# Patient Record
Sex: FEMALE | Race: WHITE | NOT HISPANIC OR LATINO | Employment: FULL TIME | URBAN - METROPOLITAN AREA
[De-identification: names, ages, dates, MRNs, and addresses within clinical notes are randomized per-mention and may not be internally consistent; named-entity substitution may affect disease eponyms.]

---

## 2017-01-03 LAB
ALBUMIN SERPL BCP-MCNC: 4.8 G/DL (ref 3.2–4.9)
ALBUMIN/GLOB SERPL: 1.8 G/DL
ALP SERPL-CCNC: 51 U/L (ref 30–99)
ALT SERPL-CCNC: 15 U/L (ref 2–50)
ANION GAP SERPL CALC-SCNC: 10 MMOL/L (ref 0–11.9)
AST SERPL-CCNC: 14 U/L (ref 12–45)
BASOPHILS # BLD AUTO: 0.4 % (ref 0–1.8)
BASOPHILS # BLD: 0.04 K/UL (ref 0–0.12)
BILIRUB SERPL-MCNC: 0.5 MG/DL (ref 0.1–1.5)
BUN SERPL-MCNC: 19 MG/DL (ref 8–22)
CALCIUM SERPL-MCNC: 9.9 MG/DL (ref 8.5–10.5)
CHLORIDE SERPL-SCNC: 106 MMOL/L (ref 96–112)
CO2 SERPL-SCNC: 20 MMOL/L (ref 20–33)
CREAT SERPL-MCNC: 0.78 MG/DL (ref 0.5–1.4)
EOSINOPHIL # BLD AUTO: 0.14 K/UL (ref 0–0.51)
EOSINOPHIL NFR BLD: 1.3 % (ref 0–6.9)
ERYTHROCYTE [DISTWIDTH] IN BLOOD BY AUTOMATED COUNT: 41.2 FL (ref 35.9–50)
GFR SERPL CREATININE-BSD FRML MDRD: >60 ML/MIN/1.73 M 2
GLOBULIN SER CALC-MCNC: 2.7 G/DL (ref 1.9–3.5)
GLUCOSE SERPL-MCNC: 98 MG/DL (ref 65–99)
HCT VFR BLD AUTO: 41.5 % (ref 37–47)
HGB BLD-MCNC: 13.8 G/DL (ref 12–16)
IMM GRANULOCYTES # BLD AUTO: 0.05 K/UL (ref 0–0.11)
IMM GRANULOCYTES NFR BLD AUTO: 0.5 % (ref 0–0.9)
LIPASE SERPL-CCNC: 61 U/L (ref 11–82)
LYMPHOCYTES # BLD AUTO: 3.89 K/UL (ref 1–4.8)
LYMPHOCYTES NFR BLD: 36.4 % (ref 22–41)
MCH RBC QN AUTO: 31.6 PG (ref 27–33)
MCHC RBC AUTO-ENTMCNC: 33.3 G/DL (ref 33.6–35)
MCV RBC AUTO: 95 FL (ref 81.4–97.8)
MONOCYTES # BLD AUTO: 0.78 K/UL (ref 0–0.85)
MONOCYTES NFR BLD AUTO: 7.3 % (ref 0–13.4)
NEUTROPHILS # BLD AUTO: 5.79 K/UL (ref 2–7.15)
NEUTROPHILS NFR BLD: 54.1 % (ref 44–72)
NRBC # BLD AUTO: 0 K/UL
NRBC BLD AUTO-RTO: 0 /100 WBC
PLATELET # BLD AUTO: 402 K/UL (ref 164–446)
PMV BLD AUTO: 9 FL (ref 9–12.9)
POTASSIUM SERPL-SCNC: 3.8 MMOL/L (ref 3.6–5.5)
PROT SERPL-MCNC: 7.5 G/DL (ref 6–8.2)
RBC # BLD AUTO: 4.37 M/UL (ref 4.2–5.4)
SODIUM SERPL-SCNC: 136 MMOL/L (ref 135–145)
WBC # BLD AUTO: 10.7 K/UL (ref 4.8–10.8)

## 2017-01-03 PROCEDURE — 85025 COMPLETE CBC W/AUTO DIFF WBC: CPT

## 2017-01-03 PROCEDURE — 83690 ASSAY OF LIPASE: CPT

## 2017-01-03 PROCEDURE — 99285 EMERGENCY DEPT VISIT HI MDM: CPT

## 2017-01-03 PROCEDURE — 80053 COMPREHEN METABOLIC PANEL: CPT

## 2017-01-03 PROCEDURE — 36415 COLL VENOUS BLD VENIPUNCTURE: CPT

## 2017-01-04 ENCOUNTER — HOSPITAL ENCOUNTER (EMERGENCY)
Facility: MEDICAL CENTER | Age: 32
End: 2017-01-04
Attending: EMERGENCY MEDICINE
Payer: MEDICAID

## 2017-01-04 VITALS
HEIGHT: 62 IN | DIASTOLIC BLOOD PRESSURE: 72 MMHG | WEIGHT: 180 LBS | OXYGEN SATURATION: 94 % | SYSTOLIC BLOOD PRESSURE: 116 MMHG | TEMPERATURE: 97.5 F | BODY MASS INDEX: 33.13 KG/M2 | RESPIRATION RATE: 12 BRPM | HEART RATE: 64 BPM

## 2017-01-04 DIAGNOSIS — G89.18 POST-OP PAIN: ICD-10-CM

## 2017-01-04 DIAGNOSIS — R53.83 OTHER FATIGUE: ICD-10-CM

## 2017-01-04 LAB
APPEARANCE UR: CLEAR
COLOR UR AUTO: YELLOW
GLUCOSE UR QL STRIP.AUTO: NEGATIVE MG/DL
HCG UR QL: NEGATIVE
KETONES UR QL STRIP.AUTO: NEGATIVE MG/DL
LEUKOCYTE ESTERASE UR QL STRIP.AUTO: NEGATIVE
NITRITE UR QL STRIP.AUTO: NEGATIVE
PH UR STRIP.AUTO: 5.5 [PH]
PROT UR QL STRIP: NEGATIVE MG/DL
RBC UR QL AUTO: NEGATIVE
SP GR UR: >=1.03

## 2017-01-04 PROCEDURE — 700111 HCHG RX REV CODE 636 W/ 250 OVERRIDE (IP): Performed by: EMERGENCY MEDICINE

## 2017-01-04 PROCEDURE — 96361 HYDRATE IV INFUSION ADD-ON: CPT

## 2017-01-04 PROCEDURE — 96375 TX/PRO/DX INJ NEW DRUG ADDON: CPT

## 2017-01-04 PROCEDURE — 81025 URINE PREGNANCY TEST: CPT

## 2017-01-04 PROCEDURE — 700105 HCHG RX REV CODE 258: Performed by: EMERGENCY MEDICINE

## 2017-01-04 PROCEDURE — 81002 URINALYSIS NONAUTO W/O SCOPE: CPT

## 2017-01-04 PROCEDURE — 96374 THER/PROPH/DIAG INJ IV PUSH: CPT

## 2017-01-04 RX ORDER — SODIUM CHLORIDE 9 MG/ML
1000 INJECTION, SOLUTION INTRAVENOUS ONCE
Status: COMPLETED | OUTPATIENT
Start: 2017-01-04 | End: 2017-01-04

## 2017-01-04 RX ORDER — ONDANSETRON 2 MG/ML
4 INJECTION INTRAMUSCULAR; INTRAVENOUS ONCE
Status: COMPLETED | OUTPATIENT
Start: 2017-01-04 | End: 2017-01-04

## 2017-01-04 RX ORDER — ONDANSETRON 2 MG/ML
4 INJECTION INTRAMUSCULAR; INTRAVENOUS ONCE
Status: DISCONTINUED | OUTPATIENT
Start: 2017-01-04 | End: 2017-01-04

## 2017-01-04 RX ORDER — MORPHINE SULFATE 4 MG/ML
4 INJECTION, SOLUTION INTRAMUSCULAR; INTRAVENOUS ONCE
Status: COMPLETED | OUTPATIENT
Start: 2017-01-04 | End: 2017-01-04

## 2017-01-04 RX ORDER — IBUPROFEN 200 MG
200 TABLET ORAL EVERY 6 HOURS PRN
COMMUNITY
End: 2017-03-13

## 2017-01-04 RX ORDER — SODIUM CHLORIDE 9 MG/ML
1000 INJECTION, SOLUTION INTRAVENOUS ONCE
Status: DISCONTINUED | OUTPATIENT
Start: 2017-01-04 | End: 2017-01-04

## 2017-01-04 RX ORDER — MORPHINE SULFATE 4 MG/ML
4 INJECTION, SOLUTION INTRAMUSCULAR; INTRAVENOUS ONCE
Status: DISCONTINUED | OUTPATIENT
Start: 2017-01-04 | End: 2017-01-04

## 2017-01-04 RX ADMIN — MORPHINE SULFATE 4 MG: 4 INJECTION INTRAVENOUS at 01:03

## 2017-01-04 RX ADMIN — SODIUM CHLORIDE 1000 ML: 9 INJECTION, SOLUTION INTRAVENOUS at 01:03

## 2017-01-04 RX ADMIN — ONDANSETRON 4 MG: 2 INJECTION, SOLUTION INTRAMUSCULAR; INTRAVENOUS at 01:02

## 2017-01-04 ASSESSMENT — PAIN SCALES - GENERAL
PAINLEVEL_OUTOF10: 6
PAINLEVEL_OUTOF10: 0

## 2017-01-04 ASSESSMENT — LIFESTYLE VARIABLES: DO YOU DRINK ALCOHOL: NO

## 2017-01-04 NOTE — ED NOTES
"Patient states that she has 6/10 pain to \"my right lower abdomen.\" No behavioral pain indicators noted. + RLQ tenderness noted on palpation. Complains of intermittent nausea, resolving without intervention. Denies vomiting.  "

## 2017-01-04 NOTE — ED NOTES
Bib remsa, report pt c/o abd pain, sob, heart palpitations, hx svt, ruptured ovarian cyst w/ ovary removal dec 18.

## 2017-01-04 NOTE — ED NOTES
Patient discharged in stable condition. Verbalized understanding of all discharge instructions. All belongings accounted for.

## 2017-01-04 NOTE — ED NOTES
"Patient states that when she urinates she has abdominal pain \"in my stomach, in the center, right below my belly button.\"  "

## 2017-01-04 NOTE — ED AVS SNAPSHOT
1/4/2017          Eullaia Rendon  [address]  [place] [address]    Dear Eulalia:    [facility] wants to ensure your discharge home is safe and you or your loved ones have had all your questions answered regarding your care after you leave the hospital.    You may receive a telephone call within two days of your discharge.  This call is to make certain you understand your discharge instructions as well as ensure we provided you with the best care possible during your stay with us.     The call will only last approximately 3-5 minutes and will be done by a nurse.    Once again, we want to ensure your discharge home is safe and that you have a clear understanding of any next steps in your care.  If you have any questions or concerns, please do not hesitate to contact us, we are here for you.  Thank you for choosing [facility] for your healthcare needs.    Sincerely,    Gunnar Qureshi    [facility]

## 2017-01-04 NOTE — DISCHARGE INSTRUCTIONS
Fatigue  Fatigue is feeling tired all of the time, a lack of energy, or a lack of motivation. Occasional or mild fatigue is often a normal response to activity or life in general. However, long-lasting (chronic) or extreme fatigue may indicate an underlying medical condition.  HOME CARE INSTRUCTIONS   Watch your fatigue for any changes. The following actions may help to lessen any discomfort you are feeling:  · Talk to your health care provider about how much sleep you need each night. Try to get the required amount every night.  · Take medicines only as directed by your health care provider.  · Eat a healthy and nutritious diet. Ask your health care provider if you need help changing your diet.  · Drink enough fluid to keep your urine clear or pale yellow.  · Practice ways of relaxing, such as yoga, meditation, massage therapy, or acupuncture.  · Exercise regularly.    · Change situations that cause you stress. Try to keep your work and personal routine reasonable.  · Do not abuse illegal drugs.  · Limit alcohol intake to no more than 1 drink per day for nonpregnant women and 2 drinks per day for men. One drink equals 12 ounces of beer, 5 ounces of wine, or 1½ ounces of hard liquor.  · Take a multivitamin, if directed by your health care provider.  SEEK MEDICAL CARE IF:   · Your fatigue does not get better.  · You have a fever.    · You have unintentional weight loss or gain.  · You have headaches.    · You have difficulty:    · Falling asleep.  · Sleeping throughout the night.  · You feel angry, guilty, anxious, or sad.     · You are unable to have a bowel movement (constipation).    · You skin is dry.     · Your legs or another part of your body is swollen.    SEEK IMMEDIATE MEDICAL CARE IF:   · You feel confused.    · Your vision is blurry.  · You feel faint or pass out.    · You have a severe headache.    · You have severe abdominal, pelvic, or back pain.    · You have chest pain, shortness of breath, or an  irregular or fast heartbeat.    · You are unable to urinate or you urinate less than normal.    · You develop abnormal bleeding, such as bleeding from the rectum, vagina, nose, lungs, or nipples.  · You vomit blood.     · You have thoughts about harming yourself or committing suicide.    · You are worried that you might harm someone else.       This information is not intended to replace advice given to you by your health care provider. Make sure you discuss any questions you have with your health care provider.     Document Released: 10/14/2008 Document Revised: 01/08/2016 Document Reviewed: 04/21/2015  [employer] Interactive Patient Education ©2016 [employer]    Pain Relief Preoperatively and Postoperatively  If you have questions, problems, or concerns about the pain that you may feel after surgery, let your health care provider know. Patients have the right to assessment and management of pain. Severe pain after surgery--and the fear or anxiety associated with that pain--may cause extreme discomfort that:  · Prevents sleep.  · Decreases the ability to breathe deeply and to cough. This can result in pneumonia or other upper airway infections.  · Causes the heart to beat more quickly and the blood pressure to be higher.  · Increases the risk for constipation and bloating.  · Decreases the ability of wounds to heal.  · May result in depression, increased anxiety, and feelings of helplessness.  Relieving pain before surgery (preoperatively) is also important because it lessens pain that you have after surgery (postoperatively). Patients who receive pain relief both before and after surgery experience greater pain relief than those who receive pain relief only after surgery. Let your health care provider know if you are having uncontrolled pain. This is very important. Pain after surgery is more difficult to manage if it is severe, so receiving prompt and adequate treatment of acute pain is necessary. If you become  constipated after taking pain medicine, drink more liquids if you can. Your health care provider may have you take a mild laxative.  PAIN CONTROL METHODS  Your health care providers follow policies and procedures about the management of your pain. These guidelines should be explained to you before surgery. Plans for pain control after surgery must be decided upon by you and your health care provider and put into use with your full understanding and agreement. Do not be afraid to ask questions about the care that you are receiving.  Your health care providers will attempt to control your pain in various ways, and these methods may be used together (multimodal analgesia). Using this approach has many benefits for you, including being able to eat, move around, and leave the hospital sooner.  As-Needed Pain Control  · You may be given pain medicine through an IV tube or as a pill or liquid that you can swallow. Let your health care provider know when you are having pain, and he or she will give you the pain medicine that is ordered for you.  IV Patient-Controlled Analgesia (PCA) Pump  · You can receive your pain medicine through an IV tube that goes into one of your veins. You can control the amount of pain medicine that you get. The pain medicine is controlled by a pump. When you push the button that is hooked up to this pump, you receive a specific amount of pain medicine. This button should be pushed only by you or by someone who is specifically assigned by you to do so. It is set up to keep you from accidentally giving yourself too much pain medicine. You will be able to start using your pain pump in the recovery room after your surgery. This method can be helpful for most types of surgery.  · Tell your health care provider:  ¨ If you are having too much pain.  ¨ If you are feeling too sleepy or nauseous.  Continuous Epidural Pain Control  · A thin, soft tube (catheter) is put into your back, outside the outer layer  of your spinal cord. Pain medicine flows through the catheter to lessen pain in areas of your body that are below the level of catheter placement. Continuous epidural pain control may work best for you if you are having surgery on your abdomen, hip area, or legs. The epidural catheter is usually put into your back shortly before surgery. It is left in until you can eat, take medicine by mouth, pass urine, and have a bowel movement.  · Giving pain medicine through the epidural catheter may help you to heal more quickly because you can do these things sooner:  ¨ Regain normal bowel and bladder function.  ¨ Return to eating.  ¨ Get up and walk.  Medicine That Numbs the Area (Local Anesthetic)  You may be given pain medicine:  · As an injection near the area of the pain (local infiltration).  · As an injection near the nerve that controls the sensation to a specific part of your body (peripheral nerve block).  · In your spine to block pain (spinal block).  · Through a local anesthetic reservoir pump. If your surgeon or anesthesiologist selects this option as a part of your pain control, one or more thin, soft tubes will be inserted into your incision site(s) at the end of surgery. These tubes will be connected to a device that is filled with a non-narcotic pain medicine. This medicine gradually empties into your incision site over the next several days. Usually, after all of the medicine is used, your health care provider will remove the tubes and throw away the device.  Opioids  · Moderate to moderately severe acute pain after surgery may respond to opioids. Opioids are narcotic pain medicine. Opioids are often combined with non-narcotic medicines to improve pain relief, lower the risk of side effects, and reduce the chance of addiction.  · If you follow your health care provider's directions about taking opioids and you do not have a history of substance abuse, your risk of becoming addicted is very small. To prevent  addiction, opioids are given for short periods of time in careful doses.  Other Methods of Pain Control  · Steroids.  · Physical therapy.  · Heat and cold therapy.  · Compression, such as wrapping an elastic bandage around the area of the pain.  · Massage.     This information is not intended to replace advice given to you by your health care provider. Make sure you discuss any questions you have with your health care provider.     Document Released: 03/09/2004 Document Revised: 01/08/2016 Document Reviewed: 03/13/2012  [employer] Interactive Patient Education ©2016 [employer]

## 2017-01-04 NOTE — ED AVS SNAPSHOT
After Visit Summary                                                                                                                Eulalia Rendon   MRN: [id number]    Department:  [facility], Emergency Dept   Date of Visit:  1/3/2017            [facility], Emergency Dept    [address] [place] [address]    Phone:  [phone]      You were seen by     Filipe Gomez M.D.      Your Diagnosis Was     Post-op pain     G89.18       These are the medications you received during your hospitalization from 01/03/2017 1747 to 01/04/2017 0218     Date/Time Order Dose Route Action    01/04/2017 0103 morphine (pf) 4 mg/ml injection 4 mg 4 mg Intravenous Given    01/04/2017 0102 ondansetron (ZOFRAN) syringe/vial injection 4 mg 4 mg Intravenous Given    01/04/2017 0103 NS infusion 1,000 mL 1,000 mL Intravenous New Bag      Follow-up Information     1. Follow up with Eve Sales M.D..    Specialty:  OB/Gyn    Contact information    [address]  [facility] [address] [phone]          2. Follow up with [facility], Emergency Dept.    Specialty:  Emergency Medicine    Why:  If symptoms worsen    Contact information    [address] [place] [id number] [phone]      Medication Information     Review all of your home medications and newly ordered medications with your primary doctor and/or pharmacist as soon as possible. Follow medication instructions as directed by your doctor and/or pharmacist.     Please keep your complete medication list with you and share with your physician. Update the information when medications are discontinued, doses are changed, or new medications (including over-the-counter products) are added; and carry medication information at all times in the event of emergency situations.               Medication List      ASK your doctor about these medications        Instructions    escitalopram 10 MG Tabs      Commonly known as:  LEXAPRO    Take 20 mg by mouth every day.   Dose:  20 mg       ibuprofen 200 MG Tabs   Commonly known as:  MOTRIN    Take 200 mg by mouth every 6 hours as needed.   Dose:  200 mg               Procedures and tests performed during your visit     CARDIAC MONITORING    CBC WITH DIFFERENTIAL    COMP METABOLIC PANEL    ESTIMATED GFR    LIPASE    O2 Protocol    POC UA    POC URINE PREGNANCY    SALINE LOCK        Discharge Instructions       Fatigue  Fatigue is feeling tired all of the time, a lack of energy, or a lack of motivation. Occasional or mild fatigue is often a normal response to activity or life in general. However, long-lasting (chronic) or extreme fatigue may indicate an underlying medical condition.  HOME CARE INSTRUCTIONS   Watch your fatigue for any changes. The following actions may help to lessen any discomfort you are feeling:  · Talk to your health care provider about how much sleep you need each night. Try to get the required amount every night.  · Take medicines only as directed by your health care provider.  · Eat a healthy and nutritious diet. Ask your health care provider if you need help changing your diet.  · Drink enough fluid to keep your urine clear or pale yellow.  · Practice ways of relaxing, such as yoga, meditation, massage therapy, or acupuncture.  · Exercise regularly.    · Change situations that cause you stress. Try to keep your work and personal routine reasonable.  · Do not abuse illegal drugs.  · Limit alcohol intake to no more than 1 drink per day for nonpregnant women and 2 drinks per day for men. One drink equals 12 ounces of beer, 5 ounces of wine, or 1½ ounces of hard liquor.  · Take a multivitamin, if directed by your health care provider.  SEEK MEDICAL CARE IF:   · Your fatigue does not get better.  · You have a fever.    · You have unintentional weight loss or gain.  · You have headaches.    · You have difficulty:    · Falling asleep.  · Sleeping  throughout the night.  · You feel angry, guilty, anxious, or sad.     · You are unable to have a bowel movement (constipation).    · You skin is dry.     · Your legs or another part of your body is swollen.    SEEK IMMEDIATE MEDICAL CARE IF:   · You feel confused.    · Your vision is blurry.  · You feel faint or pass out.    · You have a severe headache.    · You have severe abdominal, pelvic, or back pain.    · You have chest pain, shortness of breath, or an irregular or fast heartbeat.    · You are unable to urinate or you urinate less than normal.    · You develop abnormal bleeding, such as bleeding from the rectum, vagina, nose, lungs, or nipples.  · You vomit blood.     · You have thoughts about harming yourself or committing suicide.    · You are worried that you might harm someone else.       This information is not intended to replace advice given to you by your health care provider. Make sure you discuss any questions you have with your health care provider.     Document Released: 10/14/2008 Document Revised: 01/08/2016 Document Reviewed: 04/21/2015  [employer] Interactive Patient Education ©2016 [employer].    Pain Relief Preoperatively and Postoperatively  If you have questions, problems, or concerns about the pain that you may feel after surgery, let your health care provider know. Patients have the right to assessment and management of pain. Severe pain after surgery--and the fear or anxiety associated with that pain--may cause extreme discomfort that:  · Prevents sleep.  · Decreases the ability to breathe deeply and to cough. This can result in pneumonia or other upper airway infections.  · Causes the heart to beat more quickly and the blood pressure to be higher.  · Increases the risk for constipation and bloating.  · Decreases the ability of wounds to heal.  · May result in depression, increased anxiety, and feelings of helplessness.  Relieving pain before surgery (preoperatively) is also important  because it lessens pain that you have after surgery (postoperatively). Patients who receive pain relief both before and after surgery experience greater pain relief than those who receive pain relief only after surgery. Let your health care provider know if you are having uncontrolled pain. This is very important. Pain after surgery is more difficult to manage if it is severe, so receiving prompt and adequate treatment of acute pain is necessary. If you become constipated after taking pain medicine, drink more liquids if you can. Your health care provider may have you take a mild laxative.  PAIN CONTROL METHODS  Your health care providers follow policies and procedures about the management of your pain. These guidelines should be explained to you before surgery. Plans for pain control after surgery must be decided upon by you and your health care provider and put into use with your full understanding and agreement. Do not be afraid to ask questions about the care that you are receiving.  Your health care providers will attempt to control your pain in various ways, and these methods may be used together (multimodal analgesia). Using this approach has many benefits for you, including being able to eat, move around, and leave the hospital sooner.  As-Needed Pain Control  · You may be given pain medicine through an IV tube or as a pill or liquid that you can swallow. Let your health care provider know when you are having pain, and he or she will give you the pain medicine that is ordered for you.  IV Patient-Controlled Analgesia (PCA) Pump  · You can receive your pain medicine through an IV tube that goes into one of your veins. You can control the amount of pain medicine that you get. The pain medicine is controlled by a pump. When you push the button that is hooked up to this pump, you receive a specific amount of pain medicine. This button should be pushed only by you or by someone who is specifically assigned by you  to do so. It is set up to keep you from accidentally giving yourself too much pain medicine. You will be able to start using your pain pump in the recovery room after your surgery. This method can be helpful for most types of surgery.  · Tell your health care provider:  ¨ If you are having too much pain.  ¨ If you are feeling too sleepy or nauseous.  Continuous Epidural Pain Control  · A thin, soft tube (catheter) is put into your back, outside the outer layer of your spinal cord. Pain medicine flows through the catheter to lessen pain in areas of your body that are below the level of catheter placement. Continuous epidural pain control may work best for you if you are having surgery on your abdomen, hip area, or legs. The epidural catheter is usually put into your back shortly before surgery. It is left in until you can eat, take medicine by mouth, pass urine, and have a bowel movement.  · Giving pain medicine through the epidural catheter may help you to heal more quickly because you can do these things sooner:  ¨ Regain normal bowel and bladder function.  ¨ Return to eating.  ¨ Get up and walk.  Medicine That Numbs the Area (Local Anesthetic)  You may be given pain medicine:  · As an injection near the area of the pain (local infiltration).  · As an injection near the nerve that controls the sensation to a specific part of your body (peripheral nerve block).  · In your spine to block pain (spinal block).  · Through a local anesthetic reservoir pump. If your surgeon or anesthesiologist selects this option as a part of your pain control, one or more thin, soft tubes will be inserted into your incision site(s) at the end of surgery. These tubes will be connected to a device that is filled with a non-narcotic pain medicine. This medicine gradually empties into your incision site over the next several days. Usually, after all of the medicine is used, your health care provider will remove the tubes and throw away the  device.  Opioids  · Moderate to moderately severe acute pain after surgery may respond to opioids. Opioids are narcotic pain medicine. Opioids are often combined with non-narcotic medicines to improve pain relief, lower the risk of side effects, and reduce the chance of addiction.  · If you follow your health care provider's directions about taking opioids and you do not have a history of substance abuse, your risk of becoming addicted is very small. To prevent addiction, opioids are given for short periods of time in careful doses.  Other Methods of Pain Control  · Steroids.  · Physical therapy.  · Heat and cold therapy.  · Compression, such as wrapping an elastic bandage around the area of the pain.  · Massage.     This information is not intended to replace advice given to you by your health care provider. Make sure you discuss any questions you have with your health care provider.     Document Released: 03/09/2004 Document Revised: 01/08/2016 Document Reviewed: 03/13/2012  [employer] Interactive Patient Education ©2016 Elsevier Inc.            Patient Information     Patient Information    Following emergency treatment: all patient requiring follow-up care must return either to a private physician or a clinic if your condition worsens before you are able to obtain further medical attention, please return to the emergency room.     Billing Information    At [facility], we work to make the billing process streamlined for our patients.  Our Representatives are here to answer any questions you may have regarding your hospital bill.  If you have insurance coverage and have supplied your insurance information to us, we will submit a claim to your insurer on your behalf.  Should you have any questions regarding your bill, we can be reached online or by phone as follows:  Online: You are able pay your bills online or live chat with our representatives about any billing questions you may have. We are here to help Monday  - Friday from 8:00am to 7:30pm and 9:00am - 12:00pm on Saturdays.  Please visit [email or web address]  for more information.   Phone:  [phone] or [phone]    Please note that your emergency physician, surgeon, pathologist, radiologist, anesthesiologist, and other specialists are not employed by [facility] and will therefore bill separately for their services.  Please contact them directly for any questions concerning their bills at the numbers below:     Emergency Physician Services:  [phone]  [facility]:  [phone]  [facility]:  [phone]  [facility]:  [phone]    1. Your final bill may vary from the amount quoted upon discharge if all procedures are not complete at that time, or if your doctor has additional procedures of which we are not aware. You will receive an additional bill if you return to the Emergency Department at [facility] for suture removal regardless of the facility of which the sutures were placed.     2. Please arrange for settlement of this account at the emergency registration.    3. All self-pay accounts are due in full at the time of treatment.  If you are unable to meet this obligation then payment is expected within 4-5 days.     4. If you have had radiology studies (CT, X-ray, Ultrasound, MRI), you have received a preliminary result during your emergency department visit. Please contact the radiology department [phone] to receive a copy of your final result. Please discuss the Final result with your primary physician or with the follow up physician provided.     Crisis Hotline:  [place] Crisis Hotline:  6-769-ZFXDXCR or 1-930.240.3642  [place] Crisis Hotline:    1-599.668.4807 or 735-527-9361         ED Discharge Follow Up Questions    1. In order to provide you with very good care, we would like to follow up with a phone call in the next few days.  May we have your permission  to contact you?     YES /  NO    2. What is the best phone number to call you? (       )_____-__________    3. What is the best time to call you?      Morning  /  Afternoon  /  Evening                   Patient Signature:  ____________________________________________________________    Date:  ____________________________________________________________

## 2017-01-04 NOTE — ED NOTES
Patient resting on gurney. No acute distress. Denies needs at this time. Call bell within reach. Updated regarding plan of care.

## 2017-01-04 NOTE — ED NOTES
Pt c/o post op x 2 weeks, c/o dizziness, sob, heart palpitations, abd pain. Pt states pregnancy center will not see pt for f/u; pt states contracted surgeon w/ pregnancy ctr performed the surgery. Pt speaking in full sentences

## 2017-01-04 NOTE — ED PROVIDER NOTES
ED Provider Note    Scribed for Filipe Gomez M.D. by Meron King. 1/4/2017, 12:53 AM.    Primary care provider: Pedrito Felix PA-C  Means of arrival: EMS  History obtained from: patient  History limited by: none    CHIEF COMPLAINT  Chief Complaint   Patient presents with   • Abdominal Pain   • Post-Op Complications     HPI  Eulalia Rendon is a 31 y.o. female who presents to the Emergency Department by EMS for abdominal pain onset 2 weeks ago after an ovary removal surgery on 12/18/16. The patient states that her pain is in the center of her abdomen below her belly button and is a 6/10 severity. She has been taking ibuprofen at home. The patient is very nauseated but denies vomiting and believes that she may have episodic fevers but she is unsure of her exact temperature. The patient has had diarrhea regularly since her surgery. She was seen by Dr. Manley at the ED on 12/24 and referred to Dr. Maurice, she has not followed up since her last ED visit.     REVIEW OF SYSTEMS  Pertinent positives include nausea, abdominal pain, diarrhea, fevers. Pertinent negatives include no vomiting . Otherwise ten systems reviewed and otherwise negative.     PAST MEDICAL HISTORY   has a past medical history of Infectious disease; CAD (coronary artery disease); SVT (supraventricular tachycardia) ([facility]); Anxiety; Arthritis; Headache(784.0); Urinary tract infection, site not specified; SVT (supraventricular tachycardia) ([facility]) (12/21/2012); Psychiatric disorder; and Depression.    SURGICAL HISTORY   has past surgical history that includes gyn surgery; primary c section (2004); repeat c section w tubal ligation (7/15/2013); other cardiac surgery; exploratory laparotomy (12/18/2016); and dilation and evacuation (12/18/2016).    SOCIAL HISTORY  Social History   Substance Use Topics   • Smoking status: Current Every Day Smoker -- 1.00 packs/day for 15 years     Types: Cigarettes   • Smokeless tobacco: Never Used       "Comment: PtBalaji michel was smoking 1/2p a day, quit 2 weeks ago.    • Alcohol Use: No      History   Drug Use No     FAMILY HISTORY  Non-Contributory    CURRENT MEDICATIONS  Home Medications     Reviewed by Yudelka Jain R.N. (Registered Nurse) on 01/04/17 at 0046  Med List Status: Complete    Medication Last Dose Status    escitalopram (LEXAPRO) 10 MG Tab 1/4/2017 Active    ibuprofen (MOTRIN) 200 MG Tab 1/4/2017 Active                ALLERGIES  Allergies   Allergen Reactions   • Cyclobenzaprine Unspecified     Unknown  RXN=unknown   • Tramadol Hives and Rash     RXN=unkown       PHYSICAL EXAM  VITAL SIGNS: /72 mmHg  Pulse 65  Temp(Src) 36.4 °C (97.5 °F) (Temporal)  Resp 14  Ht 1.575 m (5' 2\")  Wt 81.647 kg (180 lb)  BMI 32.91 kg/m2  SpO2 97%  LMP 11/01/2016  Breastfeeding? Unknown  Pulse ox interpretation: I interpret this pulse ox as normal.  Constitutional: Alert and oriented x 3, mild Distress  HEENT: Atraumatic normocephalic, pupils are equal round reactive to light extraocular movements are intact. The nares is clear, external ears are normal, mouth shows moist mucous membranes  Neck: Supple, no JVD no tracheal deviation  Cardiovascular: Regular rate and rhythm no murmur rub or gallop 2+ pulses peripherally x4  Thorax & Lungs: No respiratory distress, no wheezes rales or rhonchi, No chest tenderness.   GI: Soft nontender nondistended positive bowel sounds, no peritoneal signs.   Skin: Warm dry no acute rash or lesion  Musculoskeletal: Moving all extremities with full range and 5 of 5 strength, no acute  deformity  Neurologic: Cranial nerves III through XII are grossly intact, no sensory deficit, no cerebellar dysfunction   Psychiatric: Appropriate affect for situation at this time      DIAGNOSTIC STUDIES / PROCEDURES  LABS  Results for orders placed or performed during the hospital encounter of 01/04/17   CBC WITH DIFFERENTIAL   Result Value Ref Range    WBC 10.7 4.8 - 10.8 K/uL    RBC 4.37 4.20 " - 5.40 M/uL    Hemoglobin 13.8 12.0 - 16.0 g/dL    Hematocrit 41.5 37.0 - 47.0 %    MCV 95.0 81.4 - 97.8 fL    MCH 31.6 27.0 - 33.0 pg    MCHC 33.3 (L) 33.6 - 35.0 g/dL    RDW 41.2 35.9 - 50.0 fL    Platelet Count 402 164 - 446 K/uL    MPV 9.0 9.0 - 12.9 fL    Neutrophils-Polys 54.10 44.00 - 72.00 %    Lymphocytes 36.40 22.00 - 41.00 %    Monocytes 7.30 0.00 - 13.40 %    Eosinophils 1.30 0.00 - 6.90 %    Basophils 0.40 0.00 - 1.80 %    Immature Granulocytes 0.50 0.00 - 0.90 %    Nucleated RBC 0.00 /100 WBC    Neutrophils (Absolute) 5.79 2.00 - 7.15 K/uL    Lymphs (Absolute) 3.89 1.00 - 4.80 K/uL    Monos (Absolute) 0.78 0.00 - 0.85 K/uL    Eos (Absolute) 0.14 0.00 - 0.51 K/uL    Baso (Absolute) 0.04 0.00 - 0.12 K/uL    Immature Granulocytes (abs) 0.05 0.00 - 0.11 K/uL    NRBC (Absolute) 0.00 K/uL   COMP METABOLIC PANEL   Result Value Ref Range    Sodium 136 135 - 145 mmol/L    Potassium 3.8 3.6 - 5.5 mmol/L    Chloride 106 96 - 112 mmol/L    Co2 20 20 - 33 mmol/L    Anion Gap 10.0 0.0 - 11.9    Glucose 98 65 - 99 mg/dL    Bun 19 8 - 22 mg/dL    Creatinine 0.78 0.50 - 1.40 mg/dL    Calcium 9.9 8.5 - 10.5 mg/dL    AST(SGOT) 14 12 - 45 U/L    ALT(SGPT) 15 2 - 50 U/L    Alkaline Phosphatase 51 30 - 99 U/L    Total Bilirubin 0.5 0.1 - 1.5 mg/dL    Albumin 4.8 3.2 - 4.9 g/dL    Total Protein 7.5 6.0 - 8.2 g/dL    Globulin 2.7 1.9 - 3.5 g/dL    A-G Ratio 1.8 g/dL   LIPASE   Result Value Ref Range    Lipase 61 11 - 82 U/L   ESTIMATED GFR   Result Value Ref Range    GFR If African American >60 >60 mL/min/1.73 m 2    GFR If Non African American >60 >60 mL/min/1.73 m 2   POC UA   Result Value Ref Range    POC Color Yellow     POC Appearance Clear     POC Glucose Negative Negative mg/dL    POC Ketones Negative Negative mg/dL    POC Specific Gravity >=1.030 (A) 1.005-1.030    POC Blood Negative Negative    POC Urine PH 5.5 5.0-8.0    POC Protein Negative Negative mg/dL    POC Nitrites Negative Negative    POC Leukocyte Esterase  "Negative Negative   POC URINE PREGNANCY   Result Value Ref Range    POC Urine Pregnancy Test Negative Negative    All labs reviewed by me.    COURSE & MEDICAL DECISION MAKING  Pertinent Labs & Imaging studies reviewed. (See chart for details)    12:53 AM - Patient seen and examined at bedside. Patient will be treated with 4mg morphine IV, 4mg Zofran IV, NS infusion 1000mL . Ordered estimated GFR, POC urine pregnancy, CBC with differential, CMP, lipase, POC urinalysis to evaluate her symptoms.     Bedside FAST shows no free intraperitoneal fluid    2:30 AM - Re-examined; The patient is resting in bed comfortably. I discussed her above findings and plans for discharge. She was given a referral to follow up with Dr. Sales OB/GYN and instructed to return to the ED if her symptoms worsen. Patient understands and agrees. Her vitals prior to discharge are: /72 mmHg  Pulse 64  Temp(Src) 36.4 °C (97.5 °F) (Temporal)  Resp 12  Ht 1.575 m (5' 2\")  Wt 81.647 kg (180 lb)  BMI 32.91 kg/m2  SpO2 94%  LMP 11/01/2016  Breastfeeding? Unknown    Medical Decision Making: Patient 2nd trip back to the ER for postoperative pain. She also states a feeling of fatigue. Initial as well as repeat abdominal exam is benign. Labs unremarkable. Patient given referral to ObGyn. She states that she is tempted to follow-up the surgeon that performed her exam twice and was unable to obtain appointment. She will return here for worsening pain any vaginal bleeding discharge any fevers chills nausea vomiting any other acute concerns is otherwise discharged home in stable condition. The patient will return for new or worsening symptoms and is stable at the time of discharge.    DISPOSITION:  Patient will be discharged home in stable condition.    FOLLOW UP:  Eve Sales M.D.  [address] [facility] [address]  [facility] [address]  [phone]          [facility], Emergency Dept  [address] [facility] " [address]  [phone]    If symptoms worsen      OUTPATIENT MEDICATIONS:  Discharge Medication List as of 1/4/2017  2:18 AM        FINAL IMPRESSION  1. Post-op pain    2. Other fatigue    3. Tobacco abuse     This dictation has been created using voice recognition software and/or scribes. The accuracy of the dictation is limited by the abilities of the software and the expertise of the scribes. I expect there may be some errors of grammar and possibly content. I made every attempt to manually correct the errors within my dictation. However, errors related to voice recognition software and/or scribes may still exist and should be interpreted within the appropriate context.     I, Meron King (Scribe), am scribing for, and in the presence of, Filipe Gomez M.D..    Electronically signed by: Meron King (Juanisibe), 1/4/2017    IFilipe M.D. personally performed the services described in this documentation, as scribed by Meron King in my presence, and it is both accurate and complete.    The note accurately reflects work and decisions made by me.  Filipe Gomez  1/4/2017  7:19 AM

## 2017-01-04 NOTE — ED NOTES
"Patient informed of need for urine sample. States \"I don't have to go to the bathroom right now.\" States \"can I get something for pain though?\"   "

## 2017-01-04 NOTE — ED NOTES
Pt to triage room via wheelchair. Apologized for the wait. Blood drawn per protocol. Pt updated on POC. Pt to senior lounge via wheelchair.

## 2017-01-04 NOTE — ED AVS SNAPSHOT
Mint Access Code: [email or web address]  Expires: 1/20/2017 11:34 AM    Mint  A secure, online tool to manage your health information     [employer]’s Mint® is a secure, online tool that connects you to your personalized health information from the privacy of your home -- day or night - making it very easy for you to manage your healthcare. Once the activation process is completed, you can even access your medical information using the Mint allyn, which is available for free in the Apple Allyn store or Google Play store.     Mint provides the following levels of access (as shown below):   My Chart Features   [facility] Primary Care Doctor [facility]  Specialists [facility]  Urgent  Care Non-[facility]  Primary Care  Doctor   Email your healthcare team securely and privately 24/7 X X X X   Manage appointments: schedule your next appointment; view details of past/upcoming appointments X      Request prescription refills. X      View recent personal medical records, including lab and immunizations X X X X   View health record, including health history, allergies, medications X X X X   Read reports about your outpatient visits, procedures, consult and ER notes X X X X   See your discharge summary, which is a recap of your hospital and/or ER visit that includes your diagnosis, lab results, and care plan. X X       How to register for Mint:  1. Go to  [email or web address].  2. Click on the Sign Up Now box, which takes you to the New Member Sign Up page. You will need to provide the following information:  a. Enter your Mint Access Code exactly as it appears at the top of this page. (You will not need to use this code after you’ve completed the sign-up process. If you do not sign up before the expiration date, you must request a new code.)   b. Enter your date of birth.   c. Enter your home email address.   d. Click Submit, and follow the next screen’s instructions.  3. Create a Mint ID. This will be your Mint  login ID and cannot be changed, so think of one that is secure and easy to remember.  4. Create a Vputi password. You can change your password at any time.  5. Enter your Password Reset Question and Answer. This can be used at a later time if you forget your password.   6. Enter your e-mail address. This allows you to receive e-mail notifications when new information is available in Vputi.  7. Click Sign Up. You can now view your health information.    For assistance activating your Vputi account, call [phone]

## 2017-03-07 ENCOUNTER — APPOINTMENT (OUTPATIENT)
Dept: RADIOLOGY | Facility: MEDICAL CENTER | Age: 32
End: 2017-03-07
Attending: EMERGENCY MEDICINE
Payer: MEDICAID

## 2017-03-07 ENCOUNTER — HOSPITAL ENCOUNTER (EMERGENCY)
Facility: MEDICAL CENTER | Age: 32
End: 2017-03-08
Attending: EMERGENCY MEDICINE
Payer: MEDICAID

## 2017-03-07 DIAGNOSIS — K08.9 CHRONIC DENTAL PAIN: ICD-10-CM

## 2017-03-07 DIAGNOSIS — K52.9 CHRONIC DIARRHEA: ICD-10-CM

## 2017-03-07 DIAGNOSIS — Z91.199 MEDICALLY NONCOMPLIANT: ICD-10-CM

## 2017-03-07 DIAGNOSIS — G89.29 CHRONIC DENTAL PAIN: ICD-10-CM

## 2017-03-07 DIAGNOSIS — N83.209 CYST OF OVARY, UNSPECIFIED LATERALITY: ICD-10-CM

## 2017-03-07 LAB
ALBUMIN SERPL BCP-MCNC: 3.8 G/DL (ref 3.2–4.9)
ALBUMIN/GLOB SERPL: 1.4 G/DL
ALP SERPL-CCNC: 47 U/L (ref 30–99)
ALT SERPL-CCNC: 31 U/L (ref 2–50)
AMPHETAMINES UR QL: NEGATIVE
ANION GAP SERPL CALC-SCNC: 6 MMOL/L (ref 0–11.9)
APPEARANCE UR: CLEAR
AST SERPL-CCNC: 25 U/L (ref 12–45)
BARBITURATES UR QL SCN: NEGATIVE
BASOPHILS # BLD AUTO: 0.2 % (ref 0–1.8)
BASOPHILS # BLD: 0.02 K/UL (ref 0–0.12)
BENZODIAZ UR QL SCN: POSITIVE
BILIRUB SERPL-MCNC: 0.6 MG/DL (ref 0.1–1.5)
BILIRUB UR QL STRIP.AUTO: NEGATIVE
BUN SERPL-MCNC: 23 MG/DL (ref 8–22)
BZE UR QL SCN: NEGATIVE
CALCIUM SERPL-MCNC: 9 MG/DL (ref 8.4–10.2)
CHLORIDE SERPL-SCNC: 104 MMOL/L (ref 96–112)
CO2 SERPL-SCNC: 24 MMOL/L (ref 20–33)
COLOR UR: YELLOW
CREAT SERPL-MCNC: 0.94 MG/DL (ref 0.5–1.4)
CULTURE IF INDICATED INDCX: NO UA CULTURE
EOSINOPHIL # BLD AUTO: 0.16 K/UL (ref 0–0.51)
EOSINOPHIL NFR BLD: 1.8 % (ref 0–6.9)
ERYTHROCYTE [DISTWIDTH] IN BLOOD BY AUTOMATED COUNT: 41.6 FL (ref 35.9–50)
GFR SERPL CREATININE-BSD FRML MDRD: >60 ML/MIN/1.73 M 2
GLOBULIN SER CALC-MCNC: 2.8 G/DL (ref 1.9–3.5)
GLUCOSE SERPL-MCNC: 90 MG/DL (ref 65–99)
GLUCOSE UR STRIP.AUTO-MCNC: NEGATIVE MG/DL
HCG SERPL QL: NEGATIVE
HCT VFR BLD AUTO: 42.1 % (ref 37–47)
HGB BLD-MCNC: 14.2 G/DL (ref 12–16)
IMM GRANULOCYTES # BLD AUTO: 0.05 K/UL (ref 0–0.11)
IMM GRANULOCYTES NFR BLD AUTO: 0.6 % (ref 0–0.9)
KETONES UR STRIP.AUTO-MCNC: NEGATIVE MG/DL
LEUKOCYTE ESTERASE UR QL STRIP.AUTO: NEGATIVE
LIPASE SERPL-CCNC: 37 U/L (ref 7–58)
LYMPHOCYTES # BLD AUTO: 3.86 K/UL (ref 1–4.8)
LYMPHOCYTES NFR BLD: 42.9 % (ref 22–41)
MCH RBC QN AUTO: 31.6 PG (ref 27–33)
MCHC RBC AUTO-ENTMCNC: 33.7 G/DL (ref 33.6–35)
MCV RBC AUTO: 93.6 FL (ref 81.4–97.8)
MICRO URNS: NORMAL
MONOCYTES # BLD AUTO: 0.68 K/UL (ref 0–0.85)
MONOCYTES NFR BLD AUTO: 7.6 % (ref 0–13.4)
NEUTROPHILS # BLD AUTO: 4.22 K/UL (ref 2–7.15)
NEUTROPHILS NFR BLD: 46.9 % (ref 44–72)
NITRITE UR QL STRIP.AUTO: NEGATIVE
NRBC # BLD AUTO: 0 K/UL
NRBC BLD AUTO-RTO: 0 /100 WBC
PCP UR QL SCN: NEGATIVE
PH UR STRIP.AUTO: 6 [PH]
PLATELET # BLD AUTO: 289 K/UL (ref 164–446)
PMV BLD AUTO: 8.9 FL (ref 9–12.9)
POTASSIUM SERPL-SCNC: 3.9 MMOL/L (ref 3.6–5.5)
PROT SERPL-MCNC: 6.6 G/DL (ref 6–8.2)
PROT UR QL STRIP: NEGATIVE MG/DL
RBC # BLD AUTO: 4.5 M/UL (ref 4.2–5.4)
RBC UR QL AUTO: NEGATIVE
SODIUM SERPL-SCNC: 134 MMOL/L (ref 135–145)
SP GR UR STRIP.AUTO: 1.02
UR OPIATES 2659: NEGATIVE
UR THC 2511T: NEGATIVE
UR TRICYCLIC 2660: NEGATIVE
WBC # BLD AUTO: 9 K/UL (ref 4.8–10.8)

## 2017-03-07 PROCEDURE — A9270 NON-COVERED ITEM OR SERVICE: HCPCS | Performed by: EMERGENCY MEDICINE

## 2017-03-07 PROCEDURE — 700117 HCHG RX CONTRAST REV CODE 255: Performed by: EMERGENCY MEDICINE

## 2017-03-07 PROCEDURE — 74177 CT ABD & PELVIS W/CONTRAST: CPT

## 2017-03-07 PROCEDURE — 700102 HCHG RX REV CODE 250 W/ 637 OVERRIDE(OP): Performed by: EMERGENCY MEDICINE

## 2017-03-07 PROCEDURE — 81003 URINALYSIS AUTO W/O SCOPE: CPT | Mod: 59

## 2017-03-07 PROCEDURE — 700105 HCHG RX REV CODE 258: Performed by: EMERGENCY MEDICINE

## 2017-03-07 PROCEDURE — 84703 CHORIONIC GONADOTROPIN ASSAY: CPT

## 2017-03-07 PROCEDURE — 85025 COMPLETE CBC W/AUTO DIFF WBC: CPT

## 2017-03-07 PROCEDURE — 99284 EMERGENCY DEPT VISIT MOD MDM: CPT

## 2017-03-07 PROCEDURE — 700111 HCHG RX REV CODE 636 W/ 250 OVERRIDE (IP): Performed by: EMERGENCY MEDICINE

## 2017-03-07 PROCEDURE — 80305 DRUG TEST PRSMV DIR OPT OBS: CPT

## 2017-03-07 PROCEDURE — 36415 COLL VENOUS BLD VENIPUNCTURE: CPT

## 2017-03-07 PROCEDURE — 80053 COMPREHEN METABOLIC PANEL: CPT

## 2017-03-07 PROCEDURE — 83690 ASSAY OF LIPASE: CPT

## 2017-03-07 RX ORDER — SODIUM CHLORIDE 9 MG/ML
1000 INJECTION, SOLUTION INTRAVENOUS ONCE
Status: COMPLETED | OUTPATIENT
Start: 2017-03-07 | End: 2017-03-08

## 2017-03-07 RX ORDER — ONDANSETRON 4 MG/1
4 TABLET, ORALLY DISINTEGRATING ORAL ONCE
Status: COMPLETED | OUTPATIENT
Start: 2017-03-07 | End: 2017-03-07

## 2017-03-07 RX ORDER — HYDROCODONE BITARTRATE AND ACETAMINOPHEN 5; 325 MG/1; MG/1
1 TABLET ORAL ONCE
Status: COMPLETED | OUTPATIENT
Start: 2017-03-07 | End: 2017-03-07

## 2017-03-07 RX ORDER — DIPHENOXYLATE HYDROCHLORIDE AND ATROPINE SULFATE 2.5; .025 MG/1; MG/1
1 TABLET ORAL 4 TIMES DAILY PRN
Qty: 14 TAB | Refills: 0 | Status: SHIPPED | OUTPATIENT
Start: 2017-03-07 | End: 2017-03-13

## 2017-03-07 RX ADMIN — SODIUM CHLORIDE 1000 ML: 9 INJECTION, SOLUTION INTRAVENOUS at 23:25

## 2017-03-07 RX ADMIN — IOHEXOL 100 ML: 350 INJECTION, SOLUTION INTRAVENOUS at 23:47

## 2017-03-07 RX ADMIN — ONDANSETRON 4 MG: 4 TABLET, ORALLY DISINTEGRATING ORAL at 23:25

## 2017-03-07 RX ADMIN — HYDROCODONE BITARTRATE AND ACETAMINOPHEN 1 TABLET: 5; 325 TABLET ORAL at 23:25

## 2017-03-07 NOTE — ED AVS SNAPSHOT
3/8/2017          Eulalia Rendon  [address]  [place] [address]    Dear Eulalia:    [facility] wants to ensure your discharge home is safe and you or your loved ones have had all your questions answered regarding your care after you leave the hospital.    You may receive a telephone call within two days of your discharge.  This call is to make certain you understand your discharge instructions as well as ensure we provided you with the best care possible during your stay with us.     The call will only last approximately 3-5 minutes and will be done by a nurse.    Once again, we want to ensure your discharge home is safe and that you have a clear understanding of any next steps in your care.  If you have any questions or concerns, please do not hesitate to contact us, we are here for you.  Thank you for choosing [facility] for your healthcare needs.    Sincerely,    Gunnar Qureshi    [facility]

## 2017-03-07 NOTE — ED AVS SNAPSHOT
Home Care Instructions                                                                                                                Eulalia Rendon   MRN: [id number]    Department:  [facility], Emergency Dept   Date of Visit:  3/7/2017            [facility], Emergency Dept    [address]    [place] [address]    Phone:  [phone]      You were seen by     Endy Allen M.D.      Your Diagnosis Was     Chronic diarrhea     K52.9       These are the medications you received during your hospitalization from 03/07/2017 2155 to 03/08/2017 0023     Date/Time Order Dose Route Action    03/07/2017 2325 NS infusion 1,000 mL 1,000 mL Intravenous New Bag    03/07/2017 2325 ondansetron (ZOFRAN ODT) dispertab 4 mg 4 mg Oral Given    03/07/2017 2325 hydrocodone-acetaminophen (NORCO) 5-325 MG per tablet 1 Tab 1 Tab Oral Given    03/07/2017 2347 iohexol (OMNIPAQUE) 350 mg/mL 100 mL Intravenous Given      Follow-up Information     1. Follow up with [facility]. Schedule an appointment as soon as possible for a visit in 1 day.    Contact information    [address] [place] [address]  [place] [address] [phone]      Medication Information     Review all of your home medications and newly ordered medications with your primary doctor and/or pharmacist as soon as possible. Follow medication instructions as directed by your doctor and/or pharmacist.     Please keep your complete medication list with you and share with your physician. Update the information when medications are discontinued, doses are changed, or new medications (including over-the-counter products) are added; and carry medication information at all times in the event of emergency situations.               Medication List      START taking these medications        Instructions    diphenoxylate-atropine 2.5-0.025 MG Tabs   Commonly known as:  LOMOTIL    Take 1 Tab by mouth 4 times a day as  needed for Diarrhea.   Dose:  1 Tab         ASK your doctor about these medications        Instructions    escitalopram 10 MG Tabs   Commonly known as:  LEXAPRO    Take 20 mg by mouth every day.   Dose:  20 mg       ibuprofen 200 MG Tabs   Commonly known as:  MOTRIN    Take 200 mg by mouth every 6 hours as needed.   Dose:  200 mg               Procedures and tests performed during your visit     CBC WITH DIFFERENTIAL    COMP METABOLIC PANEL    CONSENT FOR CONTRAST INJECTION    CT-ABDOMEN-PELVIS WITH    ESTIMATED GFR    HCG QUAL SERUM    IV Saline Lock    LIPASE    UR DRUG SCREEN(SO GOOD ONLY)    URINALYSIS CULTURE, IF INDICATED        Discharge Instructions       Chronic Diarrhea  Diarrhea is frequent loose and watery bowel movements. It can cause you to feel weak and dehydrated. Dehydration can cause you to become tired and thirsty and to have a dry mouth, decreased urination, and dark yellow urine. Diarrhea is a sign of another problem, most often an infection that will not last long. In most cases, diarrhea lasts 2-3 days. Diarrhea that lasts longer than 4 weeks is called long-lasting (chronic) diarrhea. It is important to treat your diarrhea as directed by your health care provider to lessen or prevent future episodes of diarrhea.   CAUSES   There are many causes of chronic diarrhea. The following are some possible causes:   · Gastrointestinal infections caused by viruses, bacteria, or parasites.    · Food poisoning or food allergies.    · Certain medicines, such as antibiotics, chemotherapy, and laxatives.    · Artificial sweeteners and fructose.    · Digestive disorders, such as celiac disease and inflammatory bowel diseases.    · Irritable bowel syndrome.  · Some disorders of the pancreas.  · Disorders of the thyroid.  · Reduced blood flow to the intestines.  · Cancer.  Sometimes the cause of chronic diarrhea is unknown.  RISK FACTORS  · Having a severely weakened immune system, such as from HIV or AIDS.     · Taking certain types of cancer-fighting drugs (such as with chemotherapy) or other medicines.    · Having had a recent organ transplant.    · Having a portion of the stomach or small bowel removed.    · Traveling to countries where food and water supplies are often contaminated.    SYMPTOMS   In addition to frequent, loose stools, diarrhea may cause:   · Cramping.    · Abdominal pain.    · Nausea.    · Fever.  · Fatigue.  · Urgent need to use the bathroom.  · Loss of bowel control.  DIAGNOSIS   Your health care provider must take a careful history and perform a physical exam. Tests given are based on your symptoms and history. Tests may include:   · Blood or stool tests. Three or more stool samples may be examined. Stool cultures may be used to test for bacteria or parasites.    · X-rays.    · A procedure in which a thin tube is inserted into the mouth or rectum (endoscopy). This allows the health care provider to look inside the intestine.    TREATMENT   · Treatment is aimed at correcting the cause of the diarrhea when possible.  · Diarrhea caused by an infection can often be treated with antibiotic medicines.  · Diarrhea not caused by an infection may require you to take long-term medicine or have surgery. Specific treatment should be discussed with your health care provider.  · If the cause cannot be determined, treatment aims to relieve symptoms and prevent dehydration. Serious health problems can occur if you do not maintain proper fluid levels. Treatment may include:  ¨ Taking an oral rehydration solution (ORS).  ¨ Not drinking beverages that contain caffeine (such as tea, coffee, and soft drinks).  ¨ Not drinking alcohol.  ¨ Maintaining well-balanced nutrition to help you recover faster.  HOME CARE INSTRUCTIONS   · Drink enough fluids to keep urine clear or pale yellow. Drink 1 cup (8 oz) of fluid for each diarrhea episode. Avoid fluids that contain simple sugars, fruit juices, whole milk products, and  sodas. Hydrate with an ORS. You may purchase the ORS or prepare it at home by mixing the following ingredients together:  ¨  - tsp (1.7-3  mL) table salt.  ¨ ¾ tsp (3 ¾ mL) baking soda.  ¨  tsp (1.7 mL) salt substitute containing potassium chloride.  ¨ 1 tbsp (20 mL) sugar.  ¨ 4.2 c (1 L) of water.    · Certain foods and beverages may increase the speed at which food moves through the gastrointestinal (GI) tract. These foods and beverages should be avoided. They include:  ¨ Caffeinated and alcoholic beverages.  ¨ High-fiber foods, such as raw fruits and vegetables, nuts, seeds, and whole grain breads and cereals.  ¨ Foods and beverages sweetened with sugar alcohols, such as xylitol, sorbitol, and mannitol.    · Some foods may be well tolerated and may help thicken stool. These include:  ¨ Starchy foods, such as rice, toast, pasta, low-sugar cereal, oatmeal, grits, baked potatoes, crackers, and bagels.  ¨ Bananas.  ¨ Applesauce.  · Add probiotic-rich foods to help increase healthy bacteria in the GI tract. These include yogurt and fermented milk products.  · Wash your hands well after each diarrhea episode.  · Only take over-the-counter or prescription medicines as directed by your health care provider.  · Take a warm bath to relieve any burning or pain from frequent diarrhea episodes.  SEEK MEDICAL CARE IF:   · You are not urinating as often.  · Your urine is a dark color.  · You become very tired or dizzy.  · You have severe pain in the abdomen or rectum.  · Your have blood or pus in your stools.  · Your stools look black and tarry.  SEEK IMMEDIATE MEDICAL CARE IF:   · You are unable to keep fluids down.  · You have persistent vomiting.  · You have blood in your stool.  · Your stools are black and tarry.  · You do not urinate in 6-8 hours, or there is only a small amount of very dark urine.  · You have abdominal pain that increases or localizes.  · You have weakness, dizziness, confusion, or lightheadedness.  · You  have a severe headache.  · Your diarrhea gets worse or does not get better.  · You have a fever or persistent symptoms for more than 2-3 days.  · You have a fever and your symptoms suddenly get worse.  MAKE SURE YOU:   · Understand these instructions.  · Will watch your condition.  · Will get help right away if you are not doing well or get worse.     This information is not intended to replace advice given to you by your health care provider. Make sure you discuss any questions you have with your health care provider.     Document Released: 03/09/2005 Document Revised: 12/23/2014 Document Reviewed: 06/12/2014  [employer] Interactive Patient Education ©2016 Elsevier Inc.    Toothache  Toothaches are usually caused by tooth decay (cavity). However, other causes of toothache include:  · Gum disease.  · Cracked tooth.  · Cracked filling.  · Injury.  · Jaw problem (temporo mandibular joint or TMJ disorder).  · Tooth abscess.  · Root sensitivity.  · Grinding.  · Eruption problems.  Swelling and redness around a painful tooth often means you have a dental abscess.  Pain medicine and antibiotics can help reduce symptoms, but you will need to see a dentist within the next few days to have your problem properly evaluated and treated. If tooth decay is the problem, you may need a filling or root canal to save your tooth. If the problem is more severe, your tooth may need to be pulled.  SEEK IMMEDIATE MEDICAL CARE IF:  · You cannot swallow.  · You develop severe swelling, increased redness, or increased pain in your mouth or face.  · You have a fever.  · You cannot open your mouth adequately.  Document Released: 01/25/2006 Document Revised: 03/11/2013 Document Reviewed: 03/17/2011  ExitCare® Patient Information ©2014 [employer].            Patient Information     Patient Information    Following emergency treatment: all patient requiring follow-up care must return either to a private physician or a clinic if your condition  worsens before you are able to obtain further medical attention, please return to the emergency room.     Billing Information    At [facility], we work to make the billing process streamlined for our patients.  Our Representatives are here to answer any questions you may have regarding your hospital bill.  If you have insurance coverage and have supplied your insurance information to us, we will submit a claim to your insurer on your behalf.  Should you have any questions regarding your bill, we can be reached online or by phone as follows:  Online: You are able pay your bills online or live chat with our representatives about any billing questions you may have. We are here to help Monday - Friday from 8:00am to 7:30pm and 9:00am - 12:00pm on Saturdays.  Please visit [email or web address]  for more information.   Phone:  [phone] or [phone]    Please note that your emergency physician, surgeon, pathologist, radiologist, anesthesiologist, and other specialists are not employed by [facility] and will therefore bill separately for their services.  Please contact them directly for any questions concerning their bills at the numbers below:     [facility]:  [phone]  [facility]:  [phone]  [facility]:  [phone]  [facility]:  [phone]    1. Your final bill may vary from the amount quoted upon discharge if all procedures are not complete at that time, or if your doctor has additional procedures of which we are not aware. You will receive an additional bill if you return to the Emergency Department at [facility] for suture removal regardless of the facility of which the sutures were placed.     2. Please arrange for settlement of this account at the emergency registration.    3. All self-pay accounts are due in full at the time of treatment.  If you are unable to meet this obligation then payment  is expected within 4-5 days.     4. If you have had radiology studies (CT, X-ray, Ultrasound, MRI), you have received a preliminary result during your emergency department visit. Please contact the radiology department [phone] to receive a copy of your final result. Please discuss the Final result with your primary physician or with the follow up physician provided.     Crisis Hotline:  [place] Crisis Hotline:  3-247-HJZTXDV or 1-754.821.4491  [place] Crisis Hotline:    1-115.770.1918 or 690-907-4345         ED Discharge Follow Up Questions    1. In order to provide you with very good care, we would like to follow up with a phone call in the next few days.  May we have your permission to contact you?     YES /  NO    2. What is the best phone number to call you? (       )_____-__________    3. What is the best time to call you?      Morning  /  Afternoon  /  Evening                   Patient Signature:  ____________________________________________________________    Date:  ____________________________________________________________

## 2017-03-07 NOTE — ED AVS SNAPSHOT
[employer]els Access Code: [email or web address]  Expires: 4/7/2017 12:22 AM    AltraBiofuels  A secure, online tool to manage your health information     [employer]’s AltraBiofuels® is a secure, online tool that connects you to your personalized health information from the privacy of your home -- day or night - making it very easy for you to manage your healthcare. Once the activation process is completed, you can even access your medical information using the AltraBio[employer]els allyn, which is available for free in the Apple Allyn store or Google Play store.     [employer]Bio[employer]els provides the following levels of access (as shown below):   My Chart Features   [facility] Primary Care Doctor [facility]  Specialists [facility]  Urgent  Care Non-[facility]  Primary Care  Doctor   Email your healthcare team securely and privately 24/7 X X X X   Manage appointments: schedule your next appointment; view details of past/upcoming appointments X      Request prescription refills. X      View recent personal medical records, including lab and immunizations X X X X   View health record, including health history, allergies, medications X X X X   Read reports about your outpatient visits, procedures, consult and ER notes X X X X   See your discharge summary, which is a recap of your hospital and/or ER visit that includes your diagnosis, lab results, and care plan. X X       How to register for AltraBio[employer]els:  1. Go to  [email or web address].  2. Click on the Sign Up Now box, which takes you to the New Member Sign Up page. You will need to provide the following information:  a. Enter your AltraBio[employer]els Access Code exactly as it appears at the top of this page. (You will not need to use this code after you’ve completed the sign-up process. If you do not sign up before the expiration date, you must request a new code.)   b. Enter your date of birth.   c. Enter your home email address.   d. Click Submit, and follow the next screen’s instructions.  3. Create a AltraBiofuels ID. This will be your [employer]traBio[employer]els  login ID and cannot be changed, so think of one that is secure and easy to remember.  4. Create a Videology password. You can change your password at any time.  5. Enter your Password Reset Question and Answer. This can be used at a later time if you forget your password.   6. Enter your e-mail address. This allows you to receive e-mail notifications when new information is available in Videology.  7. Click Sign Up. You can now view your health information.    For assistance activating your Videology account, call [phone]

## 2017-03-08 VITALS
OXYGEN SATURATION: 97 % | RESPIRATION RATE: 16 BRPM | BODY MASS INDEX: 34.2 KG/M2 | WEIGHT: 185.85 LBS | DIASTOLIC BLOOD PRESSURE: 92 MMHG | HEART RATE: 70 BPM | SYSTOLIC BLOOD PRESSURE: 139 MMHG | HEIGHT: 62 IN | TEMPERATURE: 97.8 F

## 2017-03-08 RX ORDER — DICYCLOMINE HYDROCHLORIDE 10 MG/1
10 CAPSULE ORAL
Qty: 20 CAP | Refills: 0 | Status: SHIPPED | OUTPATIENT
Start: 2017-03-08 | End: 2017-03-13

## 2017-03-08 NOTE — ED NOTES
"Pt assessed. Comes in with c/o diarrhea for past few months that has become worst in past 3 days to the point where she cannot control it and has become incontinent and has 7 episodes of emesis. No recent abx use. Pt has associated abdominal cramping, denies bloody stools. Pt has had this issue in the past, was tested for c-diff which was negative, and did not f/u with GI \"because the problem resolved.\" Pt also c/o right sinus pain, bloody noses, and bilateral leg pain and back pain. Awaiting EP eval. Call light within reach, will cont to monitor.   "
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
## 2017-03-08 NOTE — DISCHARGE INSTRUCTIONS
Chronic Diarrhea  Diarrhea is frequent loose and watery bowel movements. It can cause you to feel weak and dehydrated. Dehydration can cause you to become tired and thirsty and to have a dry mouth, decreased urination, and dark yellow urine. Diarrhea is a sign of another problem, most often an infection that will not last long. In most cases, diarrhea lasts 2-3 days. Diarrhea that lasts longer than 4 weeks is called long-lasting (chronic) diarrhea. It is important to treat your diarrhea as directed by your health care provider to lessen or prevent future episodes of diarrhea.   CAUSES   There are many causes of chronic diarrhea. The following are some possible causes:   · Gastrointestinal infections caused by viruses, bacteria, or parasites.    · Food poisoning or food allergies.    · Certain medicines, such as antibiotics, chemotherapy, and laxatives.    · Artificial sweeteners and fructose.    · Digestive disorders, such as celiac disease and inflammatory bowel diseases.    · Irritable bowel syndrome.  · Some disorders of the pancreas.  · Disorders of the thyroid.  · Reduced blood flow to the intestines.  · Cancer.  Sometimes the cause of chronic diarrhea is unknown.  RISK FACTORS  · Having a severely weakened immune system, such as from HIV or AIDS.    · Taking certain types of cancer-fighting drugs (such as with chemotherapy) or other medicines.    · Having had a recent organ transplant.    · Having a portion of the stomach or small bowel removed.    · Traveling to countries where food and water supplies are often contaminated.    SYMPTOMS   In addition to frequent, loose stools, diarrhea may cause:   · Cramping.    · Abdominal pain.    · Nausea.    · Fever.  · Fatigue.  · Urgent need to use the bathroom.  · Loss of bowel control.  DIAGNOSIS   Your health care provider must take a careful history and perform a physical exam. Tests given are based on your symptoms and history. Tests may include:   · Blood or  stool tests. Three or more stool samples may be examined. Stool cultures may be used to test for bacteria or parasites.    · X-rays.    · A procedure in which a thin tube is inserted into the mouth or rectum (endoscopy). This allows the health care provider to look inside the intestine.    TREATMENT   · Treatment is aimed at correcting the cause of the diarrhea when possible.  · Diarrhea caused by an infection can often be treated with antibiotic medicines.  · Diarrhea not caused by an infection may require you to take long-term medicine or have surgery. Specific treatment should be discussed with your health care provider.  · If the cause cannot be determined, treatment aims to relieve symptoms and prevent dehydration. Serious health problems can occur if you do not maintain proper fluid levels. Treatment may include:  ¨ Taking an oral rehydration solution (ORS).  ¨ Not drinking beverages that contain caffeine (such as tea, coffee, and soft drinks).  ¨ Not drinking alcohol.  ¨ Maintaining well-balanced nutrition to help you recover faster.  HOME CARE INSTRUCTIONS   · Drink enough fluids to keep urine clear or pale yellow. Drink 1 cup (8 oz) of fluid for each diarrhea episode. Avoid fluids that contain simple sugars, fruit juices, whole milk products, and sodas. Hydrate with an ORS. You may purchase the ORS or prepare it at home by mixing the following ingredients together:  ¨  - tsp (1.7-3  mL) table salt.  ¨ ¾ tsp (3 ¾ mL) baking soda.  ¨  tsp (1.7 mL) salt substitute containing potassium chloride.  ¨ 1 tbsp (20 mL) sugar.  ¨ 4.2 c (1 L) of water.    · Certain foods and beverages may increase the speed at which food moves through the gastrointestinal (GI) tract. These foods and beverages should be avoided. They include:  ¨ Caffeinated and alcoholic beverages.  ¨ High-fiber foods, such as raw fruits and vegetables, nuts, seeds, and whole grain breads and cereals.  ¨ Foods and beverages sweetened with sugar  alcohols, such as xylitol, sorbitol, and mannitol.    · Some foods may be well tolerated and may help thicken stool. These include:  ¨ Starchy foods, such as rice, toast, pasta, low-sugar cereal, oatmeal, grits, baked potatoes, crackers, and bagels.  ¨ Bananas.  ¨ Applesauce.  · Add probiotic-rich foods to help increase healthy bacteria in the GI tract. These include yogurt and fermented milk products.  · Wash your hands well after each diarrhea episode.  · Only take over-the-counter or prescription medicines as directed by your health care provider.  · Take a warm bath to relieve any burning or pain from frequent diarrhea episodes.  SEEK MEDICAL CARE IF:   · You are not urinating as often.  · Your urine is a dark color.  · You become very tired or dizzy.  · You have severe pain in the abdomen or rectum.  · Your have blood or pus in your stools.  · Your stools look black and tarry.  SEEK IMMEDIATE MEDICAL CARE IF:   · You are unable to keep fluids down.  · You have persistent vomiting.  · You have blood in your stool.  · Your stools are black and tarry.  · You do not urinate in 6-8 hours, or there is only a small amount of very dark urine.  · You have abdominal pain that increases or localizes.  · You have weakness, dizziness, confusion, or lightheadedness.  · You have a severe headache.  · Your diarrhea gets worse or does not get better.  · You have a fever or persistent symptoms for more than 2-3 days.  · You have a fever and your symptoms suddenly get worse.  MAKE SURE YOU:   · Understand these instructions.  · Will watch your condition.  · Will get help right away if you are not doing well or get worse.     This information is not intended to replace advice given to you by your health care provider. Make sure you discuss any questions you have with your health care provider.     Document Released: 03/09/2005 Document Revised: 12/23/2014 Document Reviewed: 06/12/2014  [employer] Interactive Patient Education ©2016  [employer]    Toothache  Toothaches are usually caused by tooth decay (cavity). However, other causes of toothache include:  · Gum disease.  · Cracked tooth.  · Cracked filling.  · Injury.  · Jaw problem (temporo mandibular joint or TMJ disorder).  · Tooth abscess.  · Root sensitivity.  · Grinding.  · Eruption problems.  Swelling and redness around a painful tooth often means you have a dental abscess.  Pain medicine and antibiotics can help reduce symptoms, but you will need to see a dentist within the next few days to have your problem properly evaluated and treated. If tooth decay is the problem, you may need a filling or root canal to save your tooth. If the problem is more severe, your tooth may need to be pulled.  SEEK IMMEDIATE MEDICAL CARE IF:  · You cannot swallow.  · You develop severe swelling, increased redness, or increased pain in your mouth or face.  · You have a fever.  · You cannot open your mouth adequately.  Document Released: 01/25/2006 Document Revised: 03/11/2013 Document Reviewed: 03/17/2011  [employer]® Patient Information ©2014 [employer].

## 2017-03-08 NOTE — ED NOTES
Pt to ED c/o diarrhea x 1 mo. And being incontinent of stool x 3 days.  Pt also c/o of dental pain, leg aches and frequent bloody noses.

## 2017-03-08 NOTE — ED NOTES
"Chief Complaint   Patient presents with   • Diarrhea   • Body Aches   • Dental Pain   • Vomiting     /92 mmHg  Pulse 91  Temp(Src) 36.6 °C (97.8 °F)  Resp 16  Ht 1.575 m (5' 2.01\")  Wt 84.3 kg (185 lb 13.6 oz)  BMI 33.98 kg/m2  SpO2 98%    "

## 2017-03-08 NOTE — ED NOTES
Discharge information provided. Pt verbalized understanding of discharge instructions to follow up with PCP and GI and to return to ER if condition worsens. Pt expressed the awareness of not driving or operating heavy machinery, has ride home. Pt ambulated out of ER in a steady gait, no additional questions or concerns.

## 2017-03-13 ENCOUNTER — HOSPITAL ENCOUNTER (EMERGENCY)
Facility: MEDICAL CENTER | Age: 32
End: 2017-03-13
Attending: EMERGENCY MEDICINE
Payer: MEDICAID

## 2017-03-13 VITALS
HEIGHT: 62 IN | OXYGEN SATURATION: 99 % | WEIGHT: 185.85 LBS | HEART RATE: 89 BPM | DIASTOLIC BLOOD PRESSURE: 78 MMHG | TEMPERATURE: 98.2 F | RESPIRATION RATE: 14 BRPM | SYSTOLIC BLOOD PRESSURE: 134 MMHG | BODY MASS INDEX: 34.2 KG/M2

## 2017-03-13 DIAGNOSIS — R19.7 DIARRHEA, UNSPECIFIED TYPE: ICD-10-CM

## 2017-03-13 DIAGNOSIS — R10.9 ABDOMINAL CRAMPING: ICD-10-CM

## 2017-03-13 LAB
ALBUMIN SERPL BCP-MCNC: 3.9 G/DL (ref 3.2–4.9)
ALBUMIN/GLOB SERPL: 1.4 G/DL
ALP SERPL-CCNC: 50 U/L (ref 30–99)
ALT SERPL-CCNC: 27 U/L (ref 2–50)
ANION GAP SERPL CALC-SCNC: 7 MMOL/L (ref 0–11.9)
APPEARANCE UR: CLEAR
AST SERPL-CCNC: 20 U/L (ref 12–45)
BILIRUB SERPL-MCNC: 0.5 MG/DL (ref 0.1–1.5)
BILIRUB UR QL STRIP.AUTO: NEGATIVE
BUN SERPL-MCNC: 20 MG/DL (ref 8–22)
CALCIUM SERPL-MCNC: 9.3 MG/DL (ref 8.4–10.2)
CHLORIDE SERPL-SCNC: 103 MMOL/L (ref 96–112)
CO2 SERPL-SCNC: 24 MMOL/L (ref 20–33)
COLOR UR: YELLOW
CREAT SERPL-MCNC: 0.84 MG/DL (ref 0.5–1.4)
CULTURE IF INDICATED INDCX: NO UA CULTURE
ERYTHROCYTE [DISTWIDTH] IN BLOOD BY AUTOMATED COUNT: 40.5 FL (ref 35.9–50)
GFR SERPL CREATININE-BSD FRML MDRD: >60 ML/MIN/1.73 M 2
GLOBULIN SER CALC-MCNC: 2.8 G/DL (ref 1.9–3.5)
GLUCOSE SERPL-MCNC: 93 MG/DL (ref 65–99)
GLUCOSE UR STRIP.AUTO-MCNC: NEGATIVE MG/DL
HCT VFR BLD AUTO: 41 % (ref 37–47)
HGB BLD-MCNC: 14.1 G/DL (ref 12–16)
KETONES UR STRIP.AUTO-MCNC: NEGATIVE MG/DL
LEUKOCYTE ESTERASE UR QL STRIP.AUTO: NEGATIVE
MCH RBC QN AUTO: 31.6 PG (ref 27–33)
MCHC RBC AUTO-ENTMCNC: 34.4 G/DL (ref 33.6–35)
MCV RBC AUTO: 91.9 FL (ref 81.4–97.8)
MICRO URNS: NORMAL
NITRITE UR QL STRIP.AUTO: NEGATIVE
PH UR STRIP.AUTO: 6 [PH]
PLATELET # BLD AUTO: 312 K/UL (ref 164–446)
PMV BLD AUTO: 9.3 FL (ref 9–12.9)
POTASSIUM SERPL-SCNC: 3.5 MMOL/L (ref 3.6–5.5)
PROT SERPL-MCNC: 6.7 G/DL (ref 6–8.2)
PROT UR QL STRIP: NEGATIVE MG/DL
RBC # BLD AUTO: 4.46 M/UL (ref 4.2–5.4)
RBC UR QL AUTO: NEGATIVE
SODIUM SERPL-SCNC: 134 MMOL/L (ref 135–145)
SP GR UR STRIP.AUTO: 1.02
WBC # BLD AUTO: 10.9 K/UL (ref 4.8–10.8)

## 2017-03-13 PROCEDURE — 87045 FECES CULTURE AEROBIC BACT: CPT

## 2017-03-13 PROCEDURE — 87899 AGENT NOS ASSAY W/OPTIC: CPT

## 2017-03-13 PROCEDURE — 700111 HCHG RX REV CODE 636 W/ 250 OVERRIDE (IP): Performed by: EMERGENCY MEDICINE

## 2017-03-13 PROCEDURE — 87046 STOOL CULTR AEROBIC BACT EA: CPT

## 2017-03-13 PROCEDURE — 96374 THER/PROPH/DIAG INJ IV PUSH: CPT

## 2017-03-13 PROCEDURE — 81003 URINALYSIS AUTO W/O SCOPE: CPT

## 2017-03-13 PROCEDURE — 85027 COMPLETE CBC AUTOMATED: CPT

## 2017-03-13 PROCEDURE — 80053 COMPREHEN METABOLIC PANEL: CPT

## 2017-03-13 PROCEDURE — 96375 TX/PRO/DX INJ NEW DRUG ADDON: CPT

## 2017-03-13 PROCEDURE — 36415 COLL VENOUS BLD VENIPUNCTURE: CPT

## 2017-03-13 PROCEDURE — 96372 THER/PROPH/DIAG INJ SC/IM: CPT | Mod: XU

## 2017-03-13 PROCEDURE — 99285 EMERGENCY DEPT VISIT HI MDM: CPT

## 2017-03-13 RX ORDER — DICYCLOMINE HYDROCHLORIDE 10 MG/ML
20 INJECTION INTRAMUSCULAR ONCE
Status: COMPLETED | OUTPATIENT
Start: 2017-03-13 | End: 2017-03-13

## 2017-03-13 RX ORDER — HALOPERIDOL 5 MG/ML
2 INJECTION INTRAMUSCULAR ONCE
Status: COMPLETED | OUTPATIENT
Start: 2017-03-13 | End: 2017-03-13

## 2017-03-13 RX ORDER — DIPHENOXYLATE HYDROCHLORIDE AND ATROPINE SULFATE 2.5; .025 MG/1; MG/1
1 TABLET ORAL 4 TIMES DAILY PRN
Qty: 10 TAB | Refills: 0 | Status: SHIPPED | OUTPATIENT
Start: 2017-03-13 | End: 2017-03-31

## 2017-03-13 RX ORDER — KETOROLAC TROMETHAMINE 30 MG/ML
30 INJECTION, SOLUTION INTRAMUSCULAR; INTRAVENOUS ONCE
Status: COMPLETED | OUTPATIENT
Start: 2017-03-13 | End: 2017-03-13

## 2017-03-13 RX ORDER — DICYCLOMINE HCL 20 MG
20 TABLET ORAL EVERY 6 HOURS
Qty: 28 TAB | Refills: 0 | Status: SHIPPED | OUTPATIENT
Start: 2017-03-13 | End: 2017-03-20

## 2017-03-13 RX ORDER — ONDANSETRON 2 MG/ML
4 INJECTION INTRAMUSCULAR; INTRAVENOUS ONCE
Status: COMPLETED | OUTPATIENT
Start: 2017-03-13 | End: 2017-03-13

## 2017-03-13 RX ORDER — METOCLOPRAMIDE HYDROCHLORIDE 5 MG/ML
10 INJECTION INTRAMUSCULAR; INTRAVENOUS ONCE
Status: COMPLETED | OUTPATIENT
Start: 2017-03-13 | End: 2017-03-13

## 2017-03-13 RX ADMIN — ONDANSETRON 4 MG: 2 INJECTION, SOLUTION INTRAMUSCULAR; INTRAVENOUS at 03:31

## 2017-03-13 RX ADMIN — HALOPERIDOL LACTATE 2 MG: 5 INJECTION, SOLUTION INTRAMUSCULAR at 04:06

## 2017-03-13 RX ADMIN — DICYCLOMINE HYDROCHLORIDE 20 MG: 10 INJECTION INTRAMUSCULAR at 02:45

## 2017-03-13 RX ADMIN — METOCLOPRAMIDE 10 MG: 5 INJECTION, SOLUTION INTRAMUSCULAR; INTRAVENOUS at 04:06

## 2017-03-13 RX ADMIN — KETOROLAC TROMETHAMINE 30 MG: 30 INJECTION, SOLUTION INTRAMUSCULAR; INTRAVENOUS at 02:51

## 2017-03-13 ASSESSMENT — PAIN SCALES - GENERAL: PAINLEVEL_OUTOF10: 9

## 2017-03-13 NOTE — ED AVS SNAPSHOT
3/13/2017          Eulalia Rendon  [address]  [place] [address]    Dear Eulalia:    Critical access hospital wants to ensure your discharge home is safe and you or your loved ones have had all your questions answered regarding your care after you leave the hospital.    You may receive a telephone call within two days of your discharge.  This call is to make certain you understand your discharge instructions as well as ensure we provided you with the best care possible during your stay with us.     The call will only last approximately 3-5 minutes and will be done by a nurse.    Once again, we want to ensure your discharge home is safe and that you have a clear understanding of any next steps in your care.  If you have any questions or concerns, please do not hesitate to contact us, we are here for you.  Thank you for choosing [facility] for your healthcare needs.    Sincerely,    Gunnar Qureshi    [facility]

## 2017-03-13 NOTE — ED PROVIDER NOTES
ED Provider Note    CHIEF COMPLAINT  Chief Complaint   Patient presents with   • Abdominal Cramping     For the past few months, was dx with colitis in the past month   • Nausea     Intermittently   • Diarrhea     For months       HPI  Eulalia Rendon is a 31 y.o. female who presents to the emergency department noting diarrhea for a month. The patient was seen here on the 7th of March for the same concern - had full labs (all normal), including a normal CT of the abdomen and was sent home with bentyl and lomotil. Patient states she has called GI and has an appt in April. She is here this evening with chief complaint of cramping abdominal pain though her diarrhea has improved with Lomotil and she is out of the Bentyl as well. She endorses sporadic vomiting though denies any blood in her stool or emesis. Describes pain as intermittent and crampy and very uncomfortable. Patient states that she missed 3 appointments at the [facility] and will not receive further referrals until some type of intervention. Patient states she must walk into that clinic.    Patient has been seen at this hospital multiple times within the last year and has had 18 prescribers and 6 pharmacies fill narcotic and benzo prescriptions for her.    REVIEW OF SYSTEMS  See HPI for further details. All other systems are negative.     PAST MEDICAL HISTORY   has a past medical history of Infectious disease; CAD (coronary artery disease); SVT (supraventricular tachycardia) (CMS-[facility]); Anxiety; Arthritis; Headache(784.0); Urinary tract infection, site not specified; SVT (supraventricular tachycardia) (CMS-[facility]) (12/21/2012); Psychiatric disorder; and Depression.    SOCIAL HISTORY  Social History     Social History Main Topics   • Smoking status: Current Every Day Smoker -- 1.00 packs/day for 15 years     Types: Cigarettes   • Smokeless tobacco: Never Used      Comment: Pt. states was smoking 1/2p a day, quit 2 weeks ago.    • Alcohol Use: No   • Drug Use:  "No   • Sexual Activity:     Partners: Male      Comment: none       SURGICAL HISTORY   has past surgical history that includes gyn surgery; primary c section (2004); repeat c section w tubal ligation (7/15/2013); other cardiac surgery; exploratory laparotomy (12/18/2016); and dilation and evacuation (12/18/2016).    CURRENT MEDICATIONS  Home Medications     Reviewed by Zaina Sullivan R.N. (Registered Nurse) on 03/13/17 at 0240  Med List Status: Complete    Medication Last Dose Status    escitalopram (LEXAPRO) 10 MG Tab 3/12/2017 Active                ALLERGIES  Allergies   Allergen Reactions   • Cyclobenzaprine Unspecified     \"Racing heart\"   • Tramadol Hives and Rash     headache       PHYSICAL EXAM  VITAL SIGNS: /78 mmHg  Pulse 89  Temp(Src) 36.8 °C (98.2 °F)  Resp 14  Ht 1.575 m (5' 2\")  Wt 84.3 kg (185 lb 13.6 oz)  BMI 33.98 kg/m2  SpO2 99%  LMP 02/22/2017 (Approximate)   Pulse ox interpretation: I interpret this pulse ox as normal.  Constitutional: Alert in no apparent distress.  HENT: Normocephalic, Atraumatic, MMM  Eyes: Pupils are equal and reactive. Conjunctiva normal, non-icteric.   Heart: Regular rate and rythm, no murmurs.    Lungs: Clear to auscultation bilaterally.  Abdomen: generalized abd ttp without rebound or guarding, non-distended, normal bowel sounds  Skin: Warm, Dry, No erythema, No rash.   Neurologic: Alert, Grossly non-focal.       DIFFERENTIAL DIAGNOSIS AND WORK UP PLAN    This is a 31 y.o. female who presents with the same complaint of diarrhea which has improved as well as abdominal cramping that she has been seen for multiple times recently. She is alert he called GI areas follow-up appointment. She is requesting we do stool studies at this time however the stool she has at the bedside is hard stool without evidence of diarrhea or blood. She has general abdominal discomfort without rebound or guarding I doubt any type of surgical emergency at this time. Will perform " laboratory analysis to ensure she is not dehydrated without electrolyte abnormality she'll be given Bentyl and Toradol discussed with her that narcotics are not the best thing for chronic pain.    Pertinent Lab Findings  Labs Reviewed   CBC WITHOUT DIFFERENTIAL - Abnormal; Notable for the following:     WBC 10.9 (*)     All other components within normal limits   COMP METABOLIC PANEL - Abnormal; Notable for the following:     Sodium 134 (*)     Potassium 3.5 (*)     All other components within normal limits   URINALYSIS,CULTURE IF INDICATED   CULTURE STOOL   ESTIMATED GFR        COURSE & MEDICAL DECISION MAKING  Pertinent Labs & Imaging studies reviewed. (See chart for details)    3:37 AM  The patient stated she was vomiting - RN suspects she may have been self inducing in the room - all labs are normal or unchanged beyond a mildly elevated wbc 11 - stool culture sent and pending will reassess after zofran improves    4:04 AM  Reassessed the patient states she still feels as if her abd is cramping - was silent until I walked in then began to wail in discomfort - still nauseated but no longer vomiting - will attempt reglan and haldol and then discussed w the patient she will be going home      The patient was given a repeat script for bentyl and lomotil - she understand she needs to follow up with her primary and she will receive a phone call if her cultures are positive. The patient will return for worsening symptoms and is stable at the time of discharge. The patient verbalizes understanding and will comply.    FOLLOW UP    Pedrito Felix PA-C  [address]  [place] [id number] [phone]    Schedule an appointment as soon as possible for a visit        FINAL IMPRESSION  1. Abdominal cramping    2. Diarrhea, unspecified type                 Electronically signed by: Phuong Parada, 3/13/2017 2:31 AM    This dictation has been created using voice recognition software and/or scribes. The accuracy of  the dictation is limited by the abilities of the software and the expertise of the scribes. I expect there may be some errors of grammar and possibly content. I made every attempt to manually correct the errors within my dictation. However, errors related to voice recognition software and/or scribes may still exist and should be interpreted within the appropriate context.

## 2017-03-13 NOTE — ED AVS SNAPSHOT
Home Care Instructions                                                                                                                Eulalia Rendon   MRN: [id number]    Department:  [facility], Emergency Dept   Date of Visit:  3/13/2017            [facility], Emergency Dept    [address]    [place] [address]    Phone:  [phone]      You were seen by     Phuong Parada M.D.      Your Diagnosis Was     Abdominal cramping     R10.9       These are the medications you received during your hospitalization from 03/13/2017 0217 to 03/13/2017 0434     Date/Time Order Dose Route Action    03/13/2017 0245 dicyclomine (BENTYL) injection 20 mg 20 mg Intramuscular Given    03/13/2017 0251 ketorolac (TORADOL) injection 30 mg 30 mg Intravenous Given    03/13/2017 0331 ondansetron (ZOFRAN) syringe/vial injection 4 mg 4 mg Intravenous Given    03/13/2017 0406 metoclopramide (REGLAN) injection 10 mg 10 mg Intravenous Given    03/13/2017 0406 haloperidol lactate (HALDOL) injection 2 mg 2 mg Intramuscular Given      Follow-up Information     1. Schedule an appointment as soon as possible for a visit with Pedrito Felix PA-C.    Specialty:  Family Medicine    Contact information    [address] [place] [address] [address]  [place] [address] [phone]        Medication Information     Review all of your home medications and newly ordered medications with your primary doctor and/or pharmacist as soon as possible. Follow medication instructions as directed by your doctor and/or pharmacist.     Please keep your complete medication list with you and share with your physician. Update the information when medications are discontinued, doses are changed, or new medications (including over-the-counter products) are added; and carry medication information at all times in the event of emergency situations.               Medication List      START taking these medications        Instructions      Morning Afternoon Evening Bedtime    dicyclomine 20 MG Tabs   Commonly known as:  BENTYL        Take 1 Tab by mouth every 6 hours for 7 days.   Dose:  20 mg                        diphenoxylate-atropine 2.5-0.025 MG Tabs   Commonly known as:  LOMOTIL        Take 1 Tab by mouth 4 times a day as needed for Diarrhea.   Dose:  1 Tab                          ASK your doctor about these medications        Instructions    Morning Afternoon Evening Bedtime    escitalopram 10 MG Tabs   Commonly known as:  LEXAPRO        Take 20 mg by mouth every day.   Dose:  20 mg                             Where to Get Your Medications      These medications were sent to [facility] [address] [facility], [place] - [address] [facility] [address]  [address], [place] [address]    Hours:  24-hours Phone:  [phone]    - dicyclomine 20 MG Tabs      You can get these medications from any pharmacy     Bring a paper prescription for each of these medications    - diphenoxylate-atropine 2.5-0.025 MG Tabs            Procedures and tests performed during your visit     CBC WITHOUT DIFFERENTIAL    COMP METABOLIC PANEL    CULTURE STOOL    ESTIMATED GFR    URINALYSIS CULTURE, IF INDICATED        Discharge Instructions       Diarrhea  Diarrhea is frequent loose and watery bowel movements. It can cause you to feel weak and dehydrated. Dehydration can cause you to become tired and thirsty, have a dry mouth, and have decreased urination that often is dark yellow. Diarrhea is a sign of another problem, most often an infection that will not last long. In most cases, diarrhea typically lasts 2-3 days. However, it can last longer if it is a sign of something more serious. It is important to treat your diarrhea as directed by your caregiver to lessen or prevent future episodes of diarrhea.  CAUSES   Some common causes include:  · Gastrointestinal infections caused by viruses, bacteria, or parasites.  · Food poisoning or food  allergies.  · Certain medicines, such as antibiotics, chemotherapy, and laxatives.  · Artificial sweeteners and fructose.  · Digestive disorders.  HOME CARE INSTRUCTIONS  · Ensure adequate fluid intake (hydration): Have 1 cup (8 oz) of fluid for each diarrhea episode. Avoid fluids that contain simple sugars or sports drinks, fruit juices, whole milk products, and sodas. Your urine should be clear or pale yellow if you are drinking enough fluids. Hydrate with an oral rehydration solution that you can purchase at pharmacies, retail stores, and online. You can prepare an oral rehydration solution at home by mixing the following ingredients together:  ¨  - tsp table salt.  ¨ ¾ tsp baking soda.  ¨  tsp salt substitute containing potassium chloride.  ¨ 1  tablespoons sugar.  ¨ 1 L (34 oz) of water.  · Certain foods and beverages may increase the speed at which food moves through the gastrointestinal (GI) tract. These foods and beverages should be avoided and include:  ¨ Caffeinated and alcoholic beverages.  ¨ High-fiber foods, such as raw fruits and vegetables, nuts, seeds, and whole grain breads and cereals.  ¨ Foods and beverages sweetened with sugar alcohols, such as xylitol, sorbitol, and mannitol.  · Some foods may be well tolerated and may help thicken stool including:  ¨ Starchy foods, such as rice, toast, pasta, low-sugar cereal, oatmeal, grits, baked potatoes, crackers, and bagels.  ¨ Bananas.  ¨ Applesauce.  · Add probiotic-rich foods to help increase healthy bacteria in the GI tract, such as yogurt and fermented milk products.  · Wash your hands well after each diarrhea episode.  · Only take over-the-counter or prescription medicines as directed by your caregiver.  · Take a warm bath to relieve any burning or pain from frequent diarrhea episodes.  SEEK IMMEDIATE MEDICAL CARE IF:   · You are unable to keep fluids down.  · You have persistent vomiting.  · You have blood in your stool, or your stools are black and  tarry.  · You do not urinate in 6-8 hours, or there is only a small amount of very dark urine.  · You have abdominal pain that increases or localizes.  · You have weakness, dizziness, confusion, or light-headedness.  · You have a severe headache.  · Your diarrhea gets worse or does not get better.  · You have a fever or persistent symptoms for more than 2-3 days.  · You have a fever and your symptoms suddenly get worse.  MAKE SURE YOU:   · Understand these instructions.  · Will watch your condition.  · Will get help right away if you are not doing well or get worse.     This information is not intended to replace advice given to you by your health care provider. Make sure you discuss any questions you have with your health care provider.     Document Released: 12/08/2003 Document Revised: 01/08/2016 Document Reviewed: 08/25/2013  [employer] Interactive Patient Education ©2016 [employer]            Patient Information     Patient Information    Following emergency treatment: all patient requiring follow-up care must return either to a private physician or a clinic if your condition worsens before you are able to obtain further medical attention, please return to the emergency room.     Billing Information    At [facility], we work to make the billing process streamlined for our patients.  Our Representatives are here to answer any questions you may have regarding your hospital bill.  If you have insurance coverage and have supplied your insurance information to us, we will submit a claim to your insurer on your behalf.  Should you have any questions regarding your bill, we can be reached online or by phone as follows:  Online: You are able pay your bills online or live chat with our representatives about any billing questions you may have. We are here to help Monday - Friday from 8:00am to 7:30pm and 9:00am - 12:00pm on Saturdays.  Please visit [email or web address]  for more  information.   Phone:  [phone] or [phone]    Please note that your emergency physician, surgeon, pathologist, radiologist, anesthesiologist, and other specialists are not employed by [facility] and will therefore bill separately for their services.  Please contact them directly for any questions concerning their bills at the numbers below:     Emergency Physician Services:  [phone]  [facility]:  [phone]  [facility]:  [phone]  [facility]:  [phone]    1. Your final bill may vary from the amount quoted upon discharge if all procedures are not complete at that time, or if your doctor has additional procedures of which we are not aware. You will receive an additional bill if you return to the Emergency Department at [facility] for suture removal regardless of the facility of which the sutures were placed.     2. Please arrange for settlement of this account at the emergency registration.    3. All self-pay accounts are due in full at the time of treatment.  If you are unable to meet this obligation then payment is expected within 4-5 days.     4. If you have had radiology studies (CT, X-ray, Ultrasound, MRI), you have received a preliminary result during your emergency department visit. Please contact the radiology department [phone] to receive a copy of your final result. Please discuss the Final result with your primary physician or with the follow up physician provided.     Crisis Hotline:  [place] Crisis Hotline:  4-348-LZHWHKU or 1-463.902.6158  [place] Crisis Hotline:    1-820.580.9373 or 112-269-5073         ED Discharge Follow Up Questions    1. In order to provide you with very good care, we would like to follow up with a phone call in the next few days.  May we have your permission to contact you?     YES /  NO    2. What is the best phone number to call you? (       )_____-__________    3. What is the best time to call  you?      Morning  /  Afternoon  /  Evening                   Patient Signature:  ____________________________________________________________    Date:  ____________________________________________________________

## 2017-03-13 NOTE — ED AVS SNAPSHOT
Drip In Access Code: [email or web address]  Expires: 4/7/2017  1:22 AM    Drip In  A secure, online tool to manage your health information     [employer]’s Drip In® is a secure, online tool that connects you to your personalized health information from the privacy of your home -- day or night - making it very easy for you to manage your healthcare. Once the activation process is completed, you can even access your medical information using the Drip In allyn, which is available for free in the Apple Allyn store or Google Play store.     Drip In provides the following levels of access (as shown below):   My Chart Features   [facility] Primary Care Doctor [facility]  Specialists [facility]  Urgent  Care Non-[facility]  Primary Care  Doctor   Email your healthcare team securely and privately 24/7 X X X X   Manage appointments: schedule your next appointment; view details of past/upcoming appointments X      Request prescription refills. X      View recent personal medical records, including lab and immunizations X X X X   View health record, including health history, allergies, medications X X X X   Read reports about your outpatient visits, procedures, consult and ER notes X X X X   See your discharge summary, which is a recap of your hospital and/or ER visit that includes your diagnosis, lab results, and care plan. X X       How to register for Drip In:  1. Go to  [email or web address].  2. Click on the Sign Up Now box, which takes you to the New Member Sign Up page. You will need to provide the following information:  a. Enter your Drip In Access Code exactly as it appears at the top of this page. (You will not need to use this code after you’ve completed the sign-up process. If you do not sign up before the expiration date, you must request a new code.)   b. Enter your date of birth.   c. Enter your home email address.   d. Click Submit, and follow the next screen’s instructions.  3. Create a Drip In ID. This will be your Drip In  login ID and cannot be changed, so think of one that is secure and easy to remember.  4. Create a Resermap password. You can change your password at any time.  5. Enter your Password Reset Question and Answer. This can be used at a later time if you forget your password.   6. Enter your e-mail address. This allows you to receive e-mail notifications when new information is available in Resermap.  7. Click Sign Up. You can now view your health information.    For assistance activating your Resermap account, call [phone]

## 2017-03-13 NOTE — DISCHARGE INSTRUCTIONS
Diarrhea  Diarrhea is frequent loose and watery bowel movements. It can cause you to feel weak and dehydrated. Dehydration can cause you to become tired and thirsty, have a dry mouth, and have decreased urination that often is dark yellow. Diarrhea is a sign of another problem, most often an infection that will not last long. In most cases, diarrhea typically lasts 2-3 days. However, it can last longer if it is a sign of something more serious. It is important to treat your diarrhea as directed by your caregiver to lessen or prevent future episodes of diarrhea.  CAUSES   Some common causes include:  · Gastrointestinal infections caused by viruses, bacteria, or parasites.  · Food poisoning or food allergies.  · Certain medicines, such as antibiotics, chemotherapy, and laxatives.  · Artificial sweeteners and fructose.  · Digestive disorders.  HOME CARE INSTRUCTIONS  · Ensure adequate fluid intake (hydration): Have 1 cup (8 oz) of fluid for each diarrhea episode. Avoid fluids that contain simple sugars or sports drinks, fruit juices, whole milk products, and sodas. Your urine should be clear or pale yellow if you are drinking enough fluids. Hydrate with an oral rehydration solution that you can purchase at pharmacies, retail stores, and online. You can prepare an oral rehydration solution at home by mixing the following ingredients together:  ¨  - tsp table salt.  ¨ ¾ tsp baking soda.  ¨  tsp salt substitute containing potassium chloride.  ¨ 1  tablespoons sugar.  ¨ 1 L (34 oz) of water.  · Certain foods and beverages may increase the speed at which food moves through the gastrointestinal (GI) tract. These foods and beverages should be avoided and include:  ¨ Caffeinated and alcoholic beverages.  ¨ High-fiber foods, such as raw fruits and vegetables, nuts, seeds, and whole grain breads and cereals.  ¨ Foods and beverages sweetened with sugar alcohols, such as xylitol, sorbitol, and mannitol.  · Some foods may be well  tolerated and may help thicken stool including:  ¨ Starchy foods, such as rice, toast, pasta, low-sugar cereal, oatmeal, grits, baked potatoes, crackers, and bagels.  ¨ Bananas.  ¨ Applesauce.  · Add probiotic-rich foods to help increase healthy bacteria in the GI tract, such as yogurt and fermented milk products.  · Wash your hands well after each diarrhea episode.  · Only take over-the-counter or prescription medicines as directed by your caregiver.  · Take a warm bath to relieve any burning or pain from frequent diarrhea episodes.  SEEK IMMEDIATE MEDICAL CARE IF:   · You are unable to keep fluids down.  · You have persistent vomiting.  · You have blood in your stool, or your stools are black and tarry.  · You do not urinate in 6-8 hours, or there is only a small amount of very dark urine.  · You have abdominal pain that increases or localizes.  · You have weakness, dizziness, confusion, or light-headedness.  · You have a severe headache.  · Your diarrhea gets worse or does not get better.  · You have a fever or persistent symptoms for more than 2-3 days.  · You have a fever and your symptoms suddenly get worse.  MAKE SURE YOU:   · Understand these instructions.  · Will watch your condition.  · Will get help right away if you are not doing well or get worse.     This information is not intended to replace advice given to you by your health care provider. Make sure you discuss any questions you have with your health care provider.     Document Released: 12/08/2003 Document Revised: 01/08/2016 Document Reviewed: 08/25/2013  [email or web address] Interactive Patient Education ©2016 [employer]

## 2017-03-14 LAB
E COLI SXT1+2 STL IA: NORMAL
SIGNIFICANT IND 70042: NORMAL
SITE SITE: NORMAL
SOURCE SOURCE: NORMAL

## 2017-03-16 LAB
BACTERIA STL CULT: NORMAL
E COLI SXT1+2 STL IA: NORMAL
SIGNIFICANT IND 70042: NORMAL
SITE SITE: NORMAL
SOURCE SOURCE: NORMAL

## 2017-03-22 ENCOUNTER — HOSPITAL ENCOUNTER (EMERGENCY)
Facility: MEDICAL CENTER | Age: 32
End: 2017-03-22
Attending: EMERGENCY MEDICINE
Payer: MEDICAID

## 2017-03-22 VITALS
SYSTOLIC BLOOD PRESSURE: 117 MMHG | RESPIRATION RATE: 18 BRPM | BODY MASS INDEX: 34.04 KG/M2 | HEART RATE: 72 BPM | WEIGHT: 184.97 LBS | HEIGHT: 62 IN | DIASTOLIC BLOOD PRESSURE: 67 MMHG | OXYGEN SATURATION: 98 % | TEMPERATURE: 98.7 F

## 2017-03-22 DIAGNOSIS — H66.41 SUPPURATIVE OTITIS MEDIA OF RIGHT EAR, UNSPECIFIED CHRONICITY: ICD-10-CM

## 2017-03-22 PROCEDURE — 99283 EMERGENCY DEPT VISIT LOW MDM: CPT

## 2017-03-22 RX ORDER — AMOXICILLIN 500 MG/1
500 CAPSULE ORAL 3 TIMES DAILY
Qty: 21 CAP | Refills: 0 | Status: SHIPPED | OUTPATIENT
Start: 2017-03-22 | End: 2017-03-29

## 2017-03-22 NOTE — ED AVS SNAPSHOT
Home Care Instructions                                                                                                                Eulalia Rendon   MRN: [id number]    Department:  [facility], Emergency Dept   Date of Visit:  3/22/2017            [facility], Emergency Dept    [address]    [place] [address]    Phone:  [phone]      You were seen by     Oneil Brooks M.D.      Your Diagnosis Was     Suppurative otitis media of right ear, unspecified chronicity     H66.41       Follow-up Information     1. Follow up with Pedrito Felix PA-C In 1 week.    Specialty:  Family Medicine    Contact information    [address] [place] [address] [address]  [place] [address] [phone]        Medication Information     Review all of your home medications and newly ordered medications with your primary doctor and/or pharmacist as soon as possible. Follow medication instructions as directed by your doctor and/or pharmacist.     Please keep your complete medication list with you and share with your physician. Update the information when medications are discontinued, doses are changed, or new medications (including over-the-counter products) are added; and carry medication information at all times in the event of emergency situations.               Medication List      START taking these medications        Instructions    Morning Afternoon Evening Bedtime    amoxicillin 500 MG Caps   Commonly known as:  AMOXIL        Take 1 Cap by mouth 3 times a day for 7 days.   Dose:  500 mg                          ASK your doctor about these medications        Instructions    Morning Afternoon Evening Bedtime    diphenoxylate-atropine 2.5-0.025 MG Tabs   Commonly known as:  LOMOTIL        Take 1 Tab by mouth 4 times a day as needed for Diarrhea.   Dose:  1 Tab                        escitalopram 10 MG Tabs   Commonly known as:  LEXAPRO        Take 20 mg by mouth every day.   Dose:  20  mg                             Where to Get Your Medications      You can get these medications from any pharmacy     Bring a paper prescription for each of these medications    - amoxicillin 500 MG Caps              Discharge Instructions       Otitis Media, Adult  Otitis media is redness, soreness, and inflammation of the middle ear. Otitis media may be caused by allergies or, most commonly, by infection. Often it occurs as a complication of the common cold.  SIGNS AND SYMPTOMS  Symptoms of otitis media may include:  · Earache.  · Fever.  · Ringing in your ear.  · Headache.  · Leakage of fluid from the ear.  DIAGNOSIS  To diagnose otitis media, your health care provider will examine your ear with an otoscope. This is an instrument that allows your health care provider to see into your ear in order to examine your eardrum. Your health care provider also will ask you questions about your symptoms.  TREATMENT   Typically, otitis media resolves on its own within 3-5 days. Your health care provider may prescribe medicine to ease your symptoms of pain. If otitis media does not resolve within 5 days or is recurrent, your health care provider may prescribe antibiotic medicines if he or she suspects that a bacterial infection is the cause.  HOME CARE INSTRUCTIONS   · If you were prescribed an antibiotic medicine, finish it all even if you start to feel better.  · Take medicines only as directed by your health care provider.  · Keep all follow-up visits as directed by your health care provider.  SEEK MEDICAL CARE IF:  · You have otitis media only in one ear, or bleeding from your nose, or both.  · You notice a lump on your neck.  · You are not getting better in 3-5 days.  · You feel worse instead of better.  SEEK IMMEDIATE MEDICAL CARE IF:   · You have pain that is not controlled with medicine.  · You have swelling, redness, or pain around your ear or stiffness in your neck.  · You notice that part of your face is  paralyzed.  · You notice that the bone behind your ear (mastoid) is tender when you touch it.  MAKE SURE YOU:   · Understand these instructions.  · Will watch your condition.  · Will get help right away if you are not doing well or get worse.     This information is not intended to replace advice given to you by your health care provider. Make sure you discuss any questions you have with your health care provider.     Document Released: 09/22/2005 Document Revised: 01/08/2016 Document Reviewed: 07/15/2014  Elsevier Interactive Patient Education ©2016 [employer]            Patient Information     Patient Information    Following emergency treatment: all patient requiring follow-up care must return either to a private physician or a clinic if your condition worsens before you are able to obtain further medical attention, please return to the emergency room.     Billing Information    At [facility], we work to make the billing process streamlined for our patients.  Our Representatives are here to answer any questions you may have regarding your hospital bill.  If you have insurance coverage and have supplied your insurance information to us, we will submit a claim to your insurer on your behalf.  Should you have any questions regarding your bill, we can be reached online or by phone as follows:  Online: You are able pay your bills online or live chat with our representatives about any billing questions you may have. We are here to help Monday - Friday from 8:00am to 7:30pm and 9:00am - 12:00pm on Saturdays.  Please visit [email or web address]  for more information.   Phone:  [phone] or [phone]    Please note that your emergency physician, surgeon, pathologist, radiologist, anesthesiologist, and other specialists are not employed by [facility] and will therefore bill separately for their services.  Please contact them directly for any questions concerning their bills at the  numbers below:     Emergency Physician Services:  [phone]  [facility]:  [phone]  [facility]:  [phone]  [facility]:  [phone]    1. Your final bill may vary from the amount quoted upon discharge if all procedures are not complete at that time, or if your doctor has additional procedures of which we are not aware. You will receive an additional bill if you return to the Emergency Department at [facility] hospital for suture removal regardless of the facility of which the sutures were placed.     2. Please arrange for settlement of this account at the emergency registration.    3. All self-pay accounts are due in full at the time of treatment.  If you are unable to meet this obligation then payment is expected within 4-5 days.     4. If you have had radiology studies (CT, X-ray, Ultrasound, MRI), you have received a preliminary result during your emergency department visit. Please contact the radiology department [phone] to receive a copy of your final result. Please discuss the Final result with your primary physician or with the follow up physician provided.     Crisis Hotline:  [facility] Crisis Hotline:  7-609-FAYXMBD or 1-568.322.9682  [place] Crisis Hotline:    1-647.159.6236 or 333-751-2583         ED Discharge Follow Up Questions    1. In order to provide you with very good care, we would like to follow up with a phone call in the next few days.  May we have your permission to contact you?     YES /  NO    2. What is the best phone number to call you? (       )_____-__________    3. What is the best time to call you?      Morning  /  Afternoon  /  Evening                   Patient Signature:  ____________________________________________________________    Date:  ____________________________________________________________

## 2017-03-22 NOTE — ED AVS SNAPSHOT
3/22/2017          Eulalia Rendon  [address]  [place] [address]    Dear Eulalia:    [facility] wants to ensure your discharge home is safe and you or your loved ones have had all your questions answered regarding your care after you leave the hospital.    You may receive a telephone call within two days of your discharge.  This call is to make certain you understand your discharge instructions as well as ensure we provided you with the best care possible during your stay with us.     The call will only last approximately 3-5 minutes and will be done by a nurse.    Once again, we want to ensure your discharge home is safe and that you have a clear understanding of any next steps in your care.  If you have any questions or concerns, please do not hesitate to contact us, we are here for you.  Thank you for choosing [facility] for your healthcare needs.    Sincerely,    Gunnar Qureshi    [facility]

## 2017-03-22 NOTE — ED AVS SNAPSHOT
CallistoTV Access Code: [email or web address]  Expires: 4/7/2017  1:22 AM    CallistoTV  A secure, online tool to manage your health information     [employer]’s CallistoTV® is a secure, online tool that connects you to your personalized health information from the privacy of your home -- day or night - making it very easy for you to manage your healthcare. Once the activation process is completed, you can even access your medical information using the CallistoTV allyn, which is available for free in the Apple Allyn store or Google Play store.     CallistoTV provides the following levels of access (as shown below):   My Chart Features   [facility] Primary Care Doctor [facility]  Specialists [facility]  Urgent  Care Non-[facility]  Primary Care  Doctor   Email your healthcare team securely and privately 24/7 X X X X   Manage appointments: schedule your next appointment; view details of past/upcoming appointments X      Request prescription refills. X      View recent personal medical records, including lab and immunizations X X X X   View health record, including health history, allergies, medications X X X X   Read reports about your outpatient visits, procedures, consult and ER notes X X X X   See your discharge summary, which is a recap of your hospital and/or ER visit that includes your diagnosis, lab results, and care plan. X X       How to register for CallistoTV:  1. Go to  [email or web address].  2. Click on the Sign Up Now box, which takes you to the New Member Sign Up page. You will need to provide the following information:  a. Enter your CallistoTV Access Code exactly as it appears at the top of this page. (You will not need to use this code after you’ve completed the sign-up process. If you do not sign up before the expiration date, you must request a new code.)   b. Enter your date of birth.   c. Enter your home email address.   d. Click Submit, and follow the next screen’s instructions.  3. Create a CallistoTV ID. This will be your CallistoTV  login ID and cannot be changed, so think of one that is secure and easy to remember.  4. Create a MyWobile password. You can change your password at any time.  5. Enter your Password Reset Question and Answer. This can be used at a later time if you forget your password.   6. Enter your e-mail address. This allows you to receive e-mail notifications when new information is available in MyWobile.  7. Click Sign Up. You can now view your health information.    For assistance activating your MyWobile account, call [phone]

## 2017-03-23 NOTE — DISCHARGE INSTRUCTIONS

## 2017-03-23 NOTE — ED PROVIDER NOTES
"ED Provider Note    CHIEF COMPLAINT  Chief Complaint   Patient presents with   • Ear Pain     started today   • Fever       HPI  Eulalia Rendon is a 31 y.o. female who presents with right-sided ear pain. Patient has had some congestion as well as subjective fever over the last few days but developed ear pain today. She denies any headache or neck pain. Denies any rash. Denies any cough or difficulty breathing    REVIEW OF SYSTEMS  See HPI for further details. All other systems are negative.     PAST MEDICAL HISTORY   has a past medical history of Infectious disease; CAD (coronary artery disease); SVT (supraventricular tachycardia) (CMS-[facility]); Anxiety; Arthritis; Headache(784.0); Urinary tract infection, site not specified; SVT (supraventricular tachycardia) (CMS-[facility]) (12/21/2012); Psychiatric disorder; and Depression.    SOCIAL HISTORY  Social History     Social History Main Topics   • Smoking status: Current Every Day Smoker -- 0.50 packs/day for 15 years     Types: Cigarettes   • Smokeless tobacco: Never Used      Comment: Pt. states was smoking 1/2p a day, quit 2 weeks ago.    • Alcohol Use: No   • Drug Use: No   • Sexual Activity:     Partners: Male      Comment: none       SURGICAL HISTORY   has past surgical history that includes gyn surgery; primary c section (2004); repeat c section w tubal ligation (7/15/2013); other cardiac surgery; exploratory laparotomy (12/18/2016); and dilation and evacuation (12/18/2016).    CURRENT MEDICATIONS  Home Medications     **Home medications have not yet been reviewed for this encounter**          ALLERGIES  Allergies   Allergen Reactions   • Cyclobenzaprine Unspecified     \"Racing heart\"   • Tramadol Hives and Rash     headache       PHYSICAL EXAM  VITAL SIGNS: /83 mmHg  Pulse 78  Temp(Src) 37.1 °C (98.7 °F)  Resp 18  Ht 1.575 m (5' 2.01\")  Wt 83.9 kg (184 lb 15.5 oz)  BMI 33.82 kg/m2  SpO2 98%  LMP 02/22/2017 (Approximate)  Pulse ox interpretation: I " interpret this pulse ox as normal.  Constitutional: Alert in no apparent distress.  HENT: Normocephalic, Atraumatic, Bilateral external ears normal. Nose normal. Right TM bulging with fluid, intact, canal clear, left TM normal, bilateral mastoids nontender  Eyes: Pupils are equal and reactive. Conjunctiva normal, non-icteric.   Heart: Regular rate and rythm, no murmurs.    Lungs: Clear to auscultation bilaterally.  Abd: soft, NTTP, no mass, no pulsatile mass, non-distended, no rebound or guarding  Skin: Warm, Dry, No erythema, No rash.   Neurologic: Alert, CN intactGrossly non-focal.   Psychiatric: Affect normal, Judgment normal, Mood normal, Appears appropriate and not intoxicated.           COURSE & MEDICAL DECISION MAKING  Pertinent Labs & Imaging studies reviewed. (See chart for details)    31-year-old female presented with ear pain. On exam does have evidence of otitis media. Given duration of symptoms will start on amoxicillin. Recommended over-the-counter decongestant as well. She is well-appearing here without headaches or fever or other findings to suggest meningitis or deep space infection time. No evidence of perforation, and mastoids are nontender without findings to suggest mastoiditis at this time.    The patient is referred to a primary physician for blood pressure management, diabetic screening, and for all other preventative health concerns.        The patient will return for worsening symptoms and is stable at the time of discharge. The patient verbalizes understanding and will comply.    FINAL IMPRESSION  1. Suppurative otitis media of right ear, unspecified chronicity         Electronically signed by: Oneil Brooks, 3/22/2017 6:28 PM

## 2017-03-26 PROCEDURE — 99282 EMERGENCY DEPT VISIT SF MDM: CPT

## 2017-03-27 ENCOUNTER — HOSPITAL ENCOUNTER (EMERGENCY)
Facility: MEDICAL CENTER | Age: 32
End: 2017-03-27
Attending: EMERGENCY MEDICINE
Payer: MEDICAID

## 2017-03-27 VITALS
SYSTOLIC BLOOD PRESSURE: 120 MMHG | HEART RATE: 74 BPM | OXYGEN SATURATION: 100 % | RESPIRATION RATE: 15 BRPM | HEIGHT: 62 IN | DIASTOLIC BLOOD PRESSURE: 68 MMHG | WEIGHT: 187.39 LBS | TEMPERATURE: 97.4 F | BODY MASS INDEX: 34.48 KG/M2

## 2017-03-27 DIAGNOSIS — Z86.59 HISTORY OF DEPRESSION: ICD-10-CM

## 2017-03-27 DIAGNOSIS — H93.8X3 CONGESTION OF BOTH EARS: ICD-10-CM

## 2017-03-27 DIAGNOSIS — R45.2 UNHAPPINESS: ICD-10-CM

## 2017-03-27 RX ORDER — DICYCLOMINE HYDROCHLORIDE 10 MG/1
10 CAPSULE ORAL
COMMUNITY
End: 2017-10-05

## 2017-03-27 RX ORDER — PHENYLEPHRINE HCL 10 MG/1
10 TABLET, FILM COATED ORAL EVERY 4 HOURS PRN
Qty: 20 TAB | Refills: 0 | Status: SHIPPED | OUTPATIENT
Start: 2017-03-27 | End: 2017-03-31

## 2017-03-27 RX ORDER — LOPERAMIDE HYDROCHLORIDE 2 MG/1
2 CAPSULE ORAL 4 TIMES DAILY PRN
Qty: 20 CAP | Refills: 0 | Status: SHIPPED | OUTPATIENT
Start: 2017-03-27 | End: 2017-10-05

## 2017-03-27 ASSESSMENT — LIFESTYLE VARIABLES: DO YOU DRINK ALCOHOL: NO

## 2017-03-27 ASSESSMENT — PAIN SCALES - GENERAL: PAINLEVEL_OUTOF10: 9

## 2017-03-27 NOTE — ED NOTES
"Patient seen in the ED at [facility] on 3/22/17 for the same complaint. States that she has been compliant with her medications at home. \"I feel like the amoxicillin isn't working. My ears still hurt a lot. And the amoxicillin has been giving me worse diarrhea. The Bentyl doesn't help with the diarrhea. It only helps with the cramps.\"  "

## 2017-03-27 NOTE — ED NOTES
Chief Complaint   Patient presents with   • Earache      bilateral. x 1 week. dentist told her she has a sinus infection.    • Sinus Pain     frontal and maxillary   • Diarrhea     currently being treated for collitis.    Pt ambulatory to triage with above complaint. Pt returned to [facility], educated on triage process, and to inform staff of any changes or concerns.

## 2017-03-27 NOTE — ED NOTES
Patient refusing discharge vital signs to be taken at this time.    Patient discharged per MD orders in stable condition. All belongings accounted for.

## 2017-03-27 NOTE — ED AVS SNAPSHOT
3/27/2017          Eulalia Rendon  [address]  [place] [address]    Dear Eulalia:    [facility] wants to ensure your discharge home is safe and you or your loved ones have had all your questions answered regarding your care after you leave the hospital.    You may receive a telephone call within two days of your discharge.  This call is to make certain you understand your discharge instructions as well as ensure we provided you with the best care possible during your stay with us.     The call will only last approximately 3-5 minutes and will be done by a nurse.    Once again, we want to ensure your discharge home is safe and that you have a clear understanding of any next steps in your care.  If you have any questions or concerns, please do not hesitate to contact us, we are here for you.  Thank you for choosing [facility] for your healthcare needs.    Sincerely,    Gunnar Qureshi    [facility]

## 2017-03-27 NOTE — DISCHARGE INSTRUCTIONS
Use the medications we've prescribed. Finish your antibiotics. Stop using the nasal spray, since using sprays like Afrin (aka Oxymetazoline) for more than 2 days can actually cause worsening congestion. See your primary care doctor for a follow up.    Oxymetazoline nasal spray  What is this medicine?  Oxymetazoline (OX ee me TOSHA oh ladan) is a nasal decongestant. This medicine is used to treat nasal congestion or a stuffy nose. This medicine will not treat an infection.  This medicine may be used for other purposes; ask your health care provider or pharmacist if you have questions.  COMMON BRAND NAME(S): 12 Hour Nasal , Afrin Extra Moisturizing, Afrin Nasal Sinus, Afrin, Dristan, Duration, Genasal , Mucinex Full Force, Mucinex Moisture Smart, Mucinex Sinus-Max, Nasal Relief , Gus-Synephrine 12-Hour, Gus-Synephrine Severe Sinus Congestion, Sinex 12-Hour, Sudafed OM Sinus Cold Moisturizing, Sudafed OM Sinus Congestion Moisturizing, Vicks Sinex, Zicam Extreme Congestion Relief, Zicam Intense Sinus  What should I tell my health care provider before I take this medicine?  They need to know if you have any of these conditions:  -diabetes  -heart disease  -high blood pressure  -thyroid disease  -trouble urinating due to an enlarged prostate gland  -an unusual or allergic reaction to oxymetazoline, other medicines, foods, dyes, or preservatives  -pregnant or trying to get pregnant  -breast-feeding  How should I use this medicine?  This medicine is for use in the nose. Do not take by mouth. Follow the directions on the package label. Shake well before using. Use your medicine at regular intervals or as directed by your health care provider. Do not use it more often than directed. Do not use for more than 3 days in a row without advice. Make sure that you are using your nasal spray correctly. Ask your doctor or health care provider if you have any questions.  Talk to your pediatrician regarding the use of this medicine in  children. While this drug may be prescribed for children for selected conditions, precautions do apply.  Overdosage: If you think you've taken too much of this medicine contact a poison control center or emergency room at once.  Overdosage: If you think you have taken too much of this medicine contact a poison control center or emergency room at once.  NOTE: This medicine is only for you. Do not share this medicine with others.  What if I miss a dose?  If you miss a dose, use it as soon as you can. If it is almost time for your next dose, use only that dose. Do not use double or extra doses.  What may interact with this medicine?  Do not take this medicine with any of the following medications:  -MAOIs like Marplan, Nardil, and Parnate  This list may not describe all possible interactions. Give your health care provider a list of all the medicines, herbs, non-prescription drugs, or dietary supplements you use. Also tell them if you smoke, drink alcohol, or use illegal drugs. Some items may interact with your medicine.  What should I watch for while using this medicine?  Tell your doctor or healthcare professional if your symptoms do not start to get better or if they get worse.  Do not share this bottle with anyone else as this may spread germs.  What side effects may I notice from receiving this medicine?  Side effects that you should report to your doctor or health care professional as soon as possible:  -allergic reactions like skin rash, itching or hives, swelling of the face, lips, or tongue   Side effects that usually do not require medical attention (Report these to your doctor or health care professional if they continue or are bothersome.):  -burning, stinging, or irritation in the nose right after use  -increased nasal discharge  -sneezing  This list may not describe all possible side effects. Call your doctor for medical advice about side effects. You may report side effects to FDA at  1-800-FDA-1088.  Where should I keep my medicine?  Keep out of the reach of children.  Store at room temperature between 20 and 25 degrees C (68 and 77 degrees F). Throw away any unused medicine after the expiration date.  NOTE: This sheet is a summary. It may not cover all possible information. If you have questions about this medicine, talk to your doctor, pharmacist, or health care provider.  © 2014, Elsevier/[employer] Standard. (7/18/2012 2:11:31 PM)

## 2017-03-27 NOTE — ED PROVIDER NOTES
ED Provider Note    Scribed for Dalton Yang M.D. by Ameya Jones. 3/27/2017,  1:27 AM.    CHIEF COMPLAINT  Chief Complaint   Patient presents with   • Earache      bilateral. x 1 week. dentist told her she has a sinus infection.    • Sinus Pain     frontal and maxillary   • Diarrhea     currently being treated for collitis.      HPI  Eulalia Rendon is a 31 y.o. female well-known to [facility] local emergency departments for frequent visits for multiple complaints including significant psychiatric illness who presents to the Emergency Department complaining of earache onset one week ago. The patient visited the [facility] on 3/25 for similar symptoms. She is taking amoxicillin at this time, but is also being treated for colitis. She has two days left on her amoxicillin.The patient has associated sinus pain, congestion, fever, and diarrhea. She denies any chills at this time.    This patient's history and review of systems is fairly limited, due to her very flat affect and paucity of speech.    REVIEW OF SYSTEMS  Pertinent positives include earache, sinus pain, congestion, fever, and diarrhea. Pertinent negatives include no chills. See HPI for further details.  E    PAST MEDICAL HISTORY   has a past medical history of Infectious disease; CAD (coronary artery disease); SVT (supraventricular tachycardia) (CMS-formerly [facility]); Anxiety; Arthritis; Headache(784.0); Urinary tract infection, site not specified; SVT (supraventricular tachycardia) (CMS-formerly [facility]) (12/21/2012); Psychiatric disorder; Depression; Colitis; Ear infection; and Sinus infection.    SOCIAL HISTORY  Social History     Social History Main Topics   • Smoking status: Current Every Day Smoker -- 0.50 packs/day for 15 years     Types: Cigarettes   • Smokeless tobacco: Never Used      Comment: Pt. states was smoking 1/2p a day, quit 2 weeks ago.    • Alcohol Use: No   • Drug Use: No   • Sexual Activity:     Partners: Male      Comment: none     History  "  Drug Use No     SURGICAL HISTORY   has past surgical history that includes gyn surgery; primary c section (2004); repeat c section w tubal ligation (7/15/2013); other cardiac surgery; exploratory laparotomy (12/18/2016); and dilation and evacuation (12/18/2016).    CURRENT MEDICATIONS  Home Medications     Reviewed by Yudelka Jain R.N. (Registered Nurse) on 03/27/17 at 0121  Med List Status: Complete    Medication Last Dose Status    amoxicillin (AMOXIL) 500 MG Cap 3/27/2017 Active    dicyclomine (BENTYL) 10 MG Cap 3/27/2017 Active    diphenoxylate-atropine (LOMOTIL) 2.5-0.025 MG Tab not taking Active    escitalopram (LEXAPRO) 10 MG Tab 3/27/2017 Active    multivitamin (THERAGRAN) Tab 3/27/2017 Active              ALLERGIES  Allergies   Allergen Reactions   • Cyclobenzaprine Unspecified     \"Racing heart\"   • Tramadol Hives and Rash     headache     PHYSICAL EXAM  VITAL SIGNS: /68 mmHg  Pulse 74  Temp(Src) 36.3 °C (97.4 °F)  Resp 15  Ht 1.575 m (5' 2\")  Wt 85 kg (187 lb 6.3 oz)  BMI 34.27 kg/m2  SpO2 100%  LMP 02/22/2017 (Approximate)  Pulse ox interpretation: I interpret this pulse ox as normal.  Constitutional: Alert in no apparent distress.  HENT: No signs of trauma, Bilateral TMs clear, Nose normal.   Eyes: Pupils are equal and reactive, Conjunctiva normal, Non-icteric.   Neck: Normal range of motion, No tenderness, Supple, No stridor.    Cardiovascular: Normal peripheral perfusion  Thorax & Lungs: Unlabored respirations, equal chest expansion, no accessory muscle use  Abdomen: Non-distended  Skin:  No erythema, No rash.   Back: Normal alignment and ROM  Extremities: No gross deformity  Musculoskeletal: Good range of motion in all major joints.   Neurologic: Alert, Normal motor function, No focal deficits noted.   Psychiatric: Affect hostile and flat, Judgment normal, Mood normal.    COURSE & MEDICAL DECISION MAKING  Nursing notes, VS, PMSFHx reviewed in chart.     1:27 AM Patient seen and " "examined at bedside. Her previously diagnosed ear infection seems to be essentially resolved. In discussing her continued nasal congestion and sinus congestion, she reports that she is using an over-the-counter nasal spray fairly heavily. I suspect that this is often/oxymetazoline, and that may be causing rebound symptoms. I informed her that she should finish her amoxicillin prescription and discontinue using nasal sprays. I will prescribe her some oral decongestant and loperamide for diarrhea.    She was given a referral to her primary care provider, Pedrito Felix PA-C and instructed to return to the ED if her symptoms worsen. Patient understands and agrees. Her vitals prior to discharge are: /68 mmHg  Pulse 74  Temp(Src) 36.3 °C (97.4 °F)  Resp 15  Ht 1.575 m (5' 2\")  Wt 85 kg (187 lb 6.3 oz)  BMI 34.27 kg/m2  SpO2 100%  LMP 02/22/2017 (Approximate)    The patient will return for new or worsening symptoms and is stable at the time of discharge.    DISPOSITION:  Patient will be discharged home in stable condition.    FOLLOW UP:  Pedrito Felix PA-C  [address]  [address]  [facility] [address] [phone]    Call in 1 day      FINAL IMPRESSION  1. Congestion of both ears    2. Unhappiness    3. History of depression       Ameya VILLALOBOS (Scribe), am scribing for, and in the presence of, Dalton Yang M.D..    Electronically signed by: Ameya oJnes (Scribe), 3/27/2017    IDalton M.D. personally performed the services described in this documentation, as scribed by Ameya Jones in my presence, and it is both accurate and complete.    The note accurately reflects work and decisions made by me.  Dalton Yang  3/27/2017  3:51 AM        "

## 2017-03-27 NOTE — ED AVS SNAPSHOT
[employer] Access Code: [email or web address]  Expires: 4/7/2017  1:22 AM    [employer]  A secure, online tool to manage your health information     [employer]’s [employer]® is a secure, online tool that connects you to your personalized health information from the privacy of your home -- day or night - making it very easy for you to manage your healthcare. Once the activation process is completed, you can even access your medical information using the [employer] allyn, which is available for free in the Apple Allyn store or Google Play store.     [employer] provides the following levels of access (as shown below):   My Chart Features   [facility] Primary Care Doctor [facility]  Specialists [facility]  Urgent  Care Non-[facility]  Primary Care  Doctor   Email your healthcare team securely and privately 24/7 X X X X   Manage appointments: schedule your next appointment; view details of past/upcoming appointments X      Request prescription refills. X      View recent personal medical records, including lab and immunizations X X X X   View health record, including health history, allergies, medications X X X X   Read reports about your outpatient visits, procedures, consult and ER notes X X X X   See your discharge summary, which is a recap of your hospital and/or ER visit that includes your diagnosis, lab results, and care plan. X X       How to register for [employer]:  1. Go to  [email or web address].  2. Click on the Sign Up Now box, which takes you to the New Member Sign Up page. You will need to provide the following information:  a. Enter your [employer] Access Code exactly as it appears at the top of this page. (You will not need to use this code after you’ve completed the sign-up process. If you do not sign up before the expiration date, you must request a new code.)   b. Enter your date of birth.   c. Enter your home email address.   d. Click Submit, and follow the next screen’s instructions.  3. Create a [employer] ID. This will be your [employer]  login ID and cannot be changed, so think of one that is secure and easy to remember.  4. Create a [employer] password. You can change your password at any time.  5. Enter your Password Reset Question and Answer. This can be used at a later time if you forget your password.   6. Enter your e-mail address. This allows you to receive e-mail notifications when new information is available in [employer].  7. Click Sign Up. You can now view your health information.    For assistance activating your [employer] account, call [phone]

## 2017-03-27 NOTE — ED AVS SNAPSHOT
Home Care Instructions                                                                                                                Eulalia Rendon   MRN: [id number]    Department:  [facility], Emergency Dept   Date of Visit:  3/26/2017            [facility], Emergency Dept    [address]    [place] [address]    Phone:  [phone]      You were seen by     Dalton Yang M.D.      Your Diagnosis Was     Congestion of both ears     H93.8X3       Follow-up Information     1. Follow up with Pedrito Felix PA-C. Call in 1 day.    Specialty:  Family Medicine    Contact information    [address]  [address]  [place] [address] [phone]        Medication Information     Review all of your home medications and newly ordered medications with your primary doctor and/or pharmacist as soon as possible. Follow medication instructions as directed by your doctor and/or pharmacist.     Please keep your complete medication list with you and share with your physician. Update the information when medications are discontinued, doses are changed, or new medications (including over-the-counter products) are added; and carry medication information at all times in the event of emergency situations.               Medication List      START taking these medications        Instructions    Morning Afternoon Evening Bedtime    loperamide 2 MG Caps   Commonly known as:  IMODIUM        Take 1 Cap by mouth 4 times a day as needed for Diarrhea.   Dose:  2 mg                        phenylephrine 10 MG Tabs   Commonly known as:  SUDOGEST PE        Take 1 Tab by mouth every four hours as needed for Congestion.   Dose:  10 mg                          ASK your doctor about these medications        Instructions    Morning Afternoon Evening Bedtime    amoxicillin 500 MG Caps   Commonly known as:  AMOXIL        Take 1 Cap by mouth 3 times a day for 7 days.   Dose:  500 mg                        dicyclomine 10 MG Caps   Commonly known as:  BENTYL        Take 10 mg by mouth 4 Times a Day,Before Meals and at Bedtime.   Dose:  10 mg                        diphenoxylate-atropine 2.5-0.025 MG Tabs   Commonly known as:  LOMOTIL        Take 1 Tab by mouth 4 times a day as needed for Diarrhea.   Dose:  1 Tab                        escitalopram 10 MG Tabs   Commonly known as:  LEXAPRO        Take 20 mg by mouth every day.   Dose:  20 mg                        multivitamin Tabs        Take 1 Tab by mouth every day.   Dose:  1 Tab                             Where to Get Your Medications      You can get these medications from any pharmacy     Bring a paper prescription for each of these medications    - loperamide 2 MG Caps  - phenylephrine 10 MG Tabs              Discharge Instructions       Use the medications we've prescribed. Finish your antibiotics. Stop using the nasal spray, since using sprays like Afrin (aka Oxymetazoline) for more than 2 days can actually cause worsening congestion. See your primary care doctor for a follow up.    Oxymetazoline nasal spray  What is this medicine?  Oxymetazoline (OX ee me TOSHA oh ladan) is a nasal decongestant. This medicine is used to treat nasal congestion or a stuffy nose. This medicine will not treat an infection.  This medicine may be used for other purposes; ask your health care provider or pharmacist if you have questions.  COMMON BRAND NAME(S): 12 Hour Nasal , Afrin Extra Moisturizing, Afrin Nasal Sinus, Afrin, Dristan, Duration, Genasal , Mucinex Full Force, Mucinex Moisture Smart, Mucinex Sinus-Max, Nasal Relief , Gus-Synephrine 12-Hour, Gus-Synephrine Severe Sinus Congestion, Sinex 12-Hour, Sudafed OM Sinus Cold Moisturizing, Sudafed OM Sinus Congestion Moisturizing, Vicks Sinex, Zicam Extreme Congestion Relief, Zicam Intense Sinus  What should I tell my health care provider before I take this medicine?  They need to know if you have any of these  conditions:  -diabetes  -heart disease  -high blood pressure  -thyroid disease  -trouble urinating due to an enlarged prostate gland  -an unusual or allergic reaction to oxymetazoline, other medicines, foods, dyes, or preservatives  -pregnant or trying to get pregnant  -breast-feeding  How should I use this medicine?  This medicine is for use in the nose. Do not take by mouth. Follow the directions on the package label. Shake well before using. Use your medicine at regular intervals or as directed by your health care provider. Do not use it more often than directed. Do not use for more than 3 days in a row without advice. Make sure that you are using your nasal spray correctly. Ask your doctor or health care provider if you have any questions.  Talk to your pediatrician regarding the use of this medicine in children. While this drug may be prescribed for children for selected conditions, precautions do apply.  Overdosage: If you think you've taken too much of this medicine contact a poison control center or emergency room at once.  Overdosage: If you think you have taken too much of this medicine contact a poison control center or emergency room at once.  NOTE: This medicine is only for you. Do not share this medicine with others.  What if I miss a dose?  If you miss a dose, use it as soon as you can. If it is almost time for your next dose, use only that dose. Do not use double or extra doses.  What may interact with this medicine?  Do not take this medicine with any of the following medications:  -MAOIs like Marplan, Nardil, and Parnate  This list may not describe all possible interactions. Give your health care provider a list of all the medicines, herbs, non-prescription drugs, or dietary supplements you use. Also tell them if you smoke, drink alcohol, or use illegal drugs. Some items may interact with your medicine.  What should I watch for while using this medicine?  Tell your doctor or healthcare professional  if your symptoms do not start to get better or if they get worse.  Do not share this bottle with anyone else as this may spread germs.  What side effects may I notice from receiving this medicine?  Side effects that you should report to your doctor or health care professional as soon as possible:  -allergic reactions like skin rash, itching or hives, swelling of the face, lips, or tongue   Side effects that usually do not require medical attention (Report these to your doctor or health care professional if they continue or are bothersome.):  -burning, stinging, or irritation in the nose right after use  -increased nasal discharge  -sneezing  This list may not describe all possible side effects. Call your doctor for medical advice about side effects. You may report side effects to FDA at 0-314-UVW-3295.  Where should I keep my medicine?  Keep out of the reach of children.  Store at room temperature between 20 and 25 degrees C (68 and 77 degrees F). Throw away any unused medicine after the expiration date.  NOTE: This sheet is a summary. It may not cover all possible information. If you have questions about this medicine, talk to your doctor, pharmacist, or health care provider.  © 2014, [employer]/Gold Standard. (7/18/2012 2:11:31 PM)            Patient Information     Patient Information    Following emergency treatment: all patient requiring follow-up care must return either to a private physician or a clinic if your condition worsens before you are able to obtain further medical attention, please return to the emergency room.     Billing Information    [facility] we work to make the billing process streamlined for our patients.  Our Representatives are here to answer any questions you may have regarding your hospital bill.  If you have insurance coverage and have supplied your insurance information to us, we will submit a claim to your insurer on your behalf.  Should you have any questions regarding your  bill, we can be reached online or by phone as follows:  Online: You are able pay your bills online or live chat with our representatives about any billing questions you may have. We are here to help Monday - Friday from 8:00am to 7:30pm and 9:00am - 12:00pm on Saturdays.  Please visit [email or web address]  for more information.   Phone:  [phone] or [phone]    Please note that your emergency physician, surgeon, pathologist, radiologist, anesthesiologist, and other specialists are not employed by [facility] and will therefore bill separately for their services.  Please contact them directly for any questions concerning their bills at the numbers below:     Emergency Physician Services:  [phone]  [facility]:  [phone]  [facility]:  [phone]  [facility]:  [phone]    1. Your final bill may vary from the amount quoted upon discharge if all procedures are not complete at that time, or if your doctor has additional procedures of which we are not aware. You will receive an additional bill if you return to the Emergency Department at [facility] for suture removal regardless of the facility of which the sutures were placed.     2. Please arrange for settlement of this account at the emergency registration.    3. All self-pay accounts are due in full at the time of treatment.  If you are unable to meet this obligation then payment is expected within 4-5 days.     4. If you have had radiology studies (CT, X-ray, Ultrasound, MRI), you have received a preliminary result during your emergency department visit. Please contact the radiology department [phone] to receive a copy of your final result. Please discuss the Final result with your primary physician or with the follow up physician provided.     Crisis Hotline:  [place] Crisis Hotline:  7-942-MZIKNOF or 1-163.292.9193  [place] Crisis Hotline:     0-418-698-5171 or 462-705-1238         ED Discharge Follow Up Questions    1. In order to provide you with very good care, we would like to follow up with a phone call in the next few days.  May we have your permission to contact you?     YES /  NO    2. What is the best phone number to call you? (       )_____-__________    3. What is the best time to call you?      Morning  /  Afternoon  /  Evening                   Patient Signature:  ____________________________________________________________    Date:  ____________________________________________________________

## 2017-03-31 ENCOUNTER — APPOINTMENT (OUTPATIENT)
Dept: RADIOLOGY | Facility: MEDICAL CENTER | Age: 32
End: 2017-03-31
Attending: EMERGENCY MEDICINE
Payer: MEDICAID

## 2017-03-31 ENCOUNTER — HOSPITAL ENCOUNTER (EMERGENCY)
Facility: MEDICAL CENTER | Age: 32
End: 2017-03-31
Attending: EMERGENCY MEDICINE
Payer: MEDICAID

## 2017-03-31 VITALS
DIASTOLIC BLOOD PRESSURE: 71 MMHG | RESPIRATION RATE: 16 BRPM | WEIGHT: 185.19 LBS | SYSTOLIC BLOOD PRESSURE: 114 MMHG | BODY MASS INDEX: 34.08 KG/M2 | HEIGHT: 62 IN | TEMPERATURE: 97.9 F | OXYGEN SATURATION: 99 % | HEART RATE: 76 BPM

## 2017-03-31 DIAGNOSIS — S46.912A SHOULDER STRAIN, LEFT, INITIAL ENCOUNTER: ICD-10-CM

## 2017-03-31 DIAGNOSIS — F41.8 SITUATIONAL ANXIETY: ICD-10-CM

## 2017-03-31 PROCEDURE — 73060 X-RAY EXAM OF HUMERUS: CPT | Mod: LT

## 2017-03-31 PROCEDURE — A9270 NON-COVERED ITEM OR SERVICE: HCPCS | Performed by: EMERGENCY MEDICINE

## 2017-03-31 PROCEDURE — 700102 HCHG RX REV CODE 250 W/ 637 OVERRIDE(OP): Performed by: EMERGENCY MEDICINE

## 2017-03-31 PROCEDURE — 73030 X-RAY EXAM OF SHOULDER: CPT | Mod: LT

## 2017-03-31 PROCEDURE — 99284 EMERGENCY DEPT VISIT MOD MDM: CPT

## 2017-03-31 RX ORDER — HYDROCODONE BITARTRATE AND ACETAMINOPHEN 5; 325 MG/1; MG/1
1 TABLET ORAL ONCE
Status: COMPLETED | OUTPATIENT
Start: 2017-03-31 | End: 2017-03-31

## 2017-03-31 RX ORDER — HYDROCODONE BITARTRATE AND ACETAMINOPHEN 5; 325 MG/1; MG/1
1-2 TABLET ORAL EVERY 4 HOURS PRN
Qty: 10 TAB | Refills: 0 | Status: SHIPPED | OUTPATIENT
Start: 2017-03-31 | End: 2017-10-05

## 2017-03-31 RX ORDER — METRONIDAZOLE 500 MG/1
500 TABLET ORAL 3 TIMES DAILY
Status: SHIPPED | COMMUNITY
End: 2017-03-31

## 2017-03-31 RX ADMIN — HYDROCODONE BITARTRATE AND ACETAMINOPHEN 1 TABLET: 5; 325 TABLET ORAL at 21:06

## 2017-03-31 ASSESSMENT — PAIN SCALES - GENERAL
PAINLEVEL_OUTOF10: 7
PAINLEVEL_OUTOF10: 5

## 2017-03-31 NOTE — ED AVS SNAPSHOT
[employer] Access Code: [email or web address]  Expires: 4/7/2017  1:22 AM    [employer]  A secure, online tool to manage your health information     [employer]’s [employer]® is a secure, online tool that connects you to your personalized health information from the privacy of your home -- day or night - making it very easy for you to manage your healthcare. Once the activation process is completed, you can even access your medical information using the CareParent allyn, which is available for free in the Apple Allyn store or Google Play store.     CareParent provides the following levels of access (as shown below):   My Chart Features   [facility] Primary Care Doctor [facility]  Specialists [facility]  Urgent  Care Non-[facility]  Primary Care  Doctor   Email your healthcare team securely and privately 24/7 X X X X   Manage appointments: schedule your next appointment; view details of past/upcoming appointments X      Request prescription refills. X      View recent personal medical records, including lab and immunizations X X X X   View health record, including health history, allergies, medications X X X X   Read reports about your outpatient visits, procedures, consult and ER notes X X X X   See your discharge summary, which is a recap of your hospital and/or ER visit that includes your diagnosis, lab results, and care plan. X X       How to register for [employer]:  1. Go to  [email or web address].  2. Click on the Sign Up Now box, which takes you to the New Member Sign Up page. You will need to provide the following information:  a. Enter your [employer] Access Code exactly as it appears at the top of this page. (You will not need to use this code after you’ve completed the sign-up process. If you do not sign up before the expiration date, you must request a new code.)   b. Enter your date of birth.   c. Enter your home email address.   d. Click Submit, and follow the next screen’s instructions.  3. Create a [employer]Parent ID. This will be your [employer]Parent  login ID and cannot be changed, so think of one that is secure and easy to remember.  4. Create a [employer] password. You can change your password at any time.  5. Enter your Password Reset Question and Answer. This can be used at a later time if you forget your password.   6. Enter your e-mail address. This allows you to receive e-mail notifications when new information is available in [employer].  7. Click Sign Up. You can now view your health information.    For assistance activating your [employer] account, call [phone]

## 2017-03-31 NOTE — ED AVS SNAPSHOT
Home Care Instructions                                                                                                                Eulalia Rendon   MRN: [id number]    Department:  [facility], Emergency Dept   Date of Visit:  3/31/2017            [facility], Emergency Dept    [address]    [place] [address]    Phone:  [phone]      You were seen by     Eliud Solis M.D.      Your Diagnosis Was     Shoulder strain, left, initial encounter     S46.439D       Follow-up Information     1. Follow up with [facility]. Schedule an appointment as soon as possible for a visit in 1 week.    Why:  if not improving    Contact information    [place] [id number] [phone]        Medication Information     Review all of your home medications and newly ordered medications with your primary doctor and/or pharmacist as soon as possible. Follow medication instructions as directed by your doctor and/or pharmacist.     Please keep your complete medication list with you and share with your physician. Update the information when medications are discontinued, doses are changed, or new medications (including over-the-counter products) are added; and carry medication information at all times in the event of emergency situations.               Medication List      START taking these medications        Instructions    Morning Afternoon Evening Bedtime    hydrocodone-acetaminophen 5-325 MG Tabs per tablet   Commonly known as:  NORCO        Take 1-2 Tabs by mouth every four hours as needed.   Dose:  1-2 Tab                          ASK your doctor about these medications        Instructions    Morning Afternoon Evening Bedtime    dicyclomine 10 MG Caps   Commonly known as:  BENTYL        Take 10 mg by mouth 4 Times a Day,Before Meals and at Bedtime.   Dose:  10 mg                        escitalopram 10 MG Tabs   Commonly known as:  LEXAPRO        Take 20 mg by mouth  every day.   Dose:  20 mg                        loperamide 2 MG Caps   Commonly known as:  IMODIUM        Take 1 Cap by mouth 4 times a day as needed for Diarrhea.   Dose:  2 mg                        metronidazole 500 MG Tabs   Commonly known as:  FLAGYL        Take 500 mg by mouth 3 times a day.   Dose:  500 mg                        multivitamin Tabs        Take 1 Tab by mouth every day.   Dose:  1 Tab                             Where to Get Your Medications      You can get these medications from any pharmacy     Bring a paper prescription for each of these medications    - hydrocodone-acetaminophen 5-325 MG Tabs per tablet            Procedures and tests performed during your visit     DX-HUMERUS 2+ LEFT    DX-SHOULDER 2+ LEFT    SLING        Discharge Instructions       Shoulder Sprain  A shoulder sprain is the result of damage to the tough, fiber-like tissues (ligaments) that help hold your shoulder in place. The ligaments may be stretched or torn. Besides the main shoulder joint (the ball and socket), there are several smaller joints that connect the bones in this area. A sprain usually involves one of those joints. Most often it is the acromioclavicular (or AC) joint. That is the joint that connects the collarbone (clavicle) and the shoulder blade (scapula) at the top point of the shoulder blade (acromion).  A shoulder sprain is a mild form of what is called a shoulder separation. Recovering from a shoulder sprain may take some time. For some, pain lingers for several months. Most people recover without long term problems.  CAUSES   · A shoulder sprain is usually caused by some kind of trauma. This might be:  ¨ Falling on an outstretched arm.  ¨ Being hit hard on the shoulder.  ¨ Twisting the arm.  · Shoulder sprains are more likely to occur in people who:  ¨ Play sports.  ¨ Have balance or coordination problems.  SYMPTOMS   · Pain when you move your shoulder.  · Limited ability to move the  shoulder.  · Swelling and tenderness on top of the shoulder.  · Redness or warmth in the shoulder.  · Bruising.  · A change in the shape of the shoulder.  DIAGNOSIS   Your healthcare provider may:  · Ask about your symptoms.  · Ask about recent activity that might have caused those symptoms.  · Examine your shoulder. You may be asked to do simple exercises to test movement. The other shoulder will be examined for comparison.  · Order some tests that provide a look inside the body. They can show the extent of the injury. The tests could include:  ¨ X-rays.  ¨ CT (computed tomography) scan.  ¨ MRI (magnetic resonance imaging) scan.  RISKS AND COMPLICATIONS  · Loss of full shoulder motion.  · Ongoing shoulder pain.  TREATMENT   How long it takes to recover from a shoulder sprain depends on how severe it was. Treatment options may include:  · Rest. You should not use the arm or shoulder until it heals.  · Ice. For 2 or 3 days after the injury, put an ice pack on the shoulder up to 4 times a day. It should stay on for 15 to 20 minutes each time. Wrap the ice in a towel so it does not touch your skin.  · Over-the-counter medicine to relieve pain.  · A sling or brace. This will keep the arm still while the shoulder is healing.  · Physical therapy or rehabilitation exercises. These will help you regain strength and motion. Ask your healthcare provider when it is OK to begin these exercises.  · Surgery. The need for surgery is rare with a sprained shoulder, but some people may need surgery to keep the joint in place and reduce pain.  HOME CARE INSTRUCTIONS   · Ask your healthcare provider about what you should and should not do while your shoulder heals.  · Make sure you know how to apply ice to the correct area of your shoulder.  · Talk with your healthcare provider about which medications should be used for pain and swelling.  · If rehabilitation therapy will be needed, ask your healthcare provider to refer you to a  therapist. If it is not recommended, then ask about at-home exercises. Find out when exercise should begin.  SEEK MEDICAL CARE IF:   Your pain, swelling, or redness at the joint increases.  SEEK IMMEDIATE MEDICAL CARE IF:   · You have a fever.  · You cannot move your arm or shoulder.     This information is not intended to replace advice given to you by your health care provider. Make sure you discuss any questions you have with your health care provider.     Document Released: 05/06/2010 Document Revised: 03/11/2013 Document Reviewed: 04/11/2016  [employer] Interactive Patient Education ©2016 [employer]            Patient Information     Patient Information    Following emergency treatment: all patient requiring follow-up care must return either to a private physician or a clinic if your condition worsens before you are able to obtain further medical attention, please return to the emergency room.     Billing Information    At [facility], we work to make the billing process streamlined for our patients.  Our Representatives are here to answer any questions you may have regarding your hospital bill.  If you have insurance coverage and have supplied your insurance information to us, we will submit a claim to your insurer on your behalf.  Should you have any questions regarding your bill, we can be reached online or by phone as follows:  Online: You are able pay your bills online or live chat with our representatives about any billing questions you may have. We are here to help Monday - Friday from 8:00am to 7:30pm and 9:00am - 12:00pm on Saturdays.  Please visit [email or web address]  for more information.   Phone:  [phone] or [phone]    Please note that your emergency physician, surgeon, pathologist, radiologist, anesthesiologist, and other specialists are not employed by [facility] [place] and will therefore bill separately for their services.  Please contact them directly for  any questions concerning their bills at the numbers below:     Emergency Physician Services:  [phone]  [facility]:  [phone]  [facility]:  [phone]  [facility]:  [phone]    1. Your final bill may vary from the amount quoted upon discharge if all procedures are not complete at that time, or if your doctor has additional procedures of which we are not aware. You will receive an additional bill if you return to the Emergency Department at [facility] for suture removal regardless of the facility of which the sutures were placed.     2. Please arrange for settlement of this account at the emergency registration.    3. All self-pay accounts are due in full at the time of treatment.  If you are unable to meet this obligation then payment is expected within 4-5 days.     4. If you have had radiology studies (CT, X-ray, Ultrasound, MRI), you have received a preliminary result during your emergency department visit. Please contact the radiology department [phone] to receive a copy of your final result. Please discuss the Final result with your primary physician or with the follow up physician provided.     Crisis Hotline:  [place] Crisis Hotline:  1-152-VQBVMUH or 1-375.191.3588  [place] Crisis Hotline:    1-550.837.5188 or 121-644-7591         ED Discharge Follow Up Questions    1. In order to provide you with very good care, we would like to follow up with a phone call in the next few days.  May we have your permission to contact you?     YES /  NO    2. What is the best phone number to call you? (       )_____-__________    3. What is the best time to call you?      Morning  /  Afternoon  /  Evening                   Patient Signature:  ____________________________________________________________    Date:  ____________________________________________________________

## 2017-04-01 NOTE — DISCHARGE INSTRUCTIONS
Shoulder Sprain  A shoulder sprain is the result of damage to the tough, fiber-like tissues (ligaments) that help hold your shoulder in place. The ligaments may be stretched or torn. Besides the main shoulder joint (the ball and socket), there are several smaller joints that connect the bones in this area. A sprain usually involves one of those joints. Most often it is the acromioclavicular (or AC) joint. That is the joint that connects the collarbone (clavicle) and the shoulder blade (scapula) at the top point of the shoulder blade (acromion).  A shoulder sprain is a mild form of what is called a shoulder separation. Recovering from a shoulder sprain may take some time. For some, pain lingers for several months. Most people recover without long term problems.  CAUSES   · A shoulder sprain is usually caused by some kind of trauma. This might be:  ¨ Falling on an outstretched arm.  ¨ Being hit hard on the shoulder.  ¨ Twisting the arm.  · Shoulder sprains are more likely to occur in people who:  ¨ Play sports.  ¨ Have balance or coordination problems.  SYMPTOMS   · Pain when you move your shoulder.  · Limited ability to move the shoulder.  · Swelling and tenderness on top of the shoulder.  · Redness or warmth in the shoulder.  · Bruising.  · A change in the shape of the shoulder.  DIAGNOSIS   Your healthcare provider may:  · Ask about your symptoms.  · Ask about recent activity that might have caused those symptoms.  · Examine your shoulder. You may be asked to do simple exercises to test movement. The other shoulder will be examined for comparison.  · Order some tests that provide a look inside the body. They can show the extent of the injury. The tests could include:  ¨ X-rays.  ¨ CT (computed tomography) scan.  ¨ MRI (magnetic resonance imaging) scan.  RISKS AND COMPLICATIONS  · Loss of full shoulder motion.  · Ongoing shoulder pain.  TREATMENT   How long it takes to recover from a shoulder sprain depends on how  severe it was. Treatment options may include:  · Rest. You should not use the arm or shoulder until it heals.  · Ice. For 2 or 3 days after the injury, put an ice pack on the shoulder up to 4 times a day. It should stay on for 15 to 20 minutes each time. Wrap the ice in a towel so it does not touch your skin.  · Over-the-counter medicine to relieve pain.  · A sling or brace. This will keep the arm still while the shoulder is healing.  · Physical therapy or rehabilitation exercises. These will help you regain strength and motion. Ask your healthcare provider when it is OK to begin these exercises.  · Surgery. The need for surgery is rare with a sprained shoulder, but some people may need surgery to keep the joint in place and reduce pain.  HOME CARE INSTRUCTIONS   · Ask your healthcare provider about what you should and should not do while your shoulder heals.  · Make sure you know how to apply ice to the correct area of your shoulder.  · Talk with your healthcare provider about which medications should be used for pain and swelling.  · If rehabilitation therapy will be needed, ask your healthcare provider to refer you to a therapist. If it is not recommended, then ask about at-home exercises. Find out when exercise should begin.  SEEK MEDICAL CARE IF:   Your pain, swelling, or redness at the joint increases.  SEEK IMMEDIATE MEDICAL CARE IF:   · You have a fever.  · You cannot move your arm or shoulder.     This information is not intended to replace advice given to you by your health care provider. Make sure you discuss any questions you have with your health care provider.     Document Released: 05/06/2010 Document Revised: 03/11/2013 Document Reviewed: 04/11/2016  [employer] Interactive Patient Education ©2016 [employer]
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
## 2017-04-01 NOTE — ED NOTES
Fitted with sling CMS reassessed. Discharge instructions provided.  Pt verbalized the understanding of discharge instructions to follow up with PCP and to return to ER if condition worsens.  Pt ambulated out of ER without difficulty.   Patient educated not to combine driving or alcohol with norco. Boyfriend to drive home.

## 2017-05-12 NOTE — ED NOTES
"Chief Complaint   Patient presents with   • Abdominal Cramping     For the past few months, was dx with colitis in the past month   • Nausea     Intermittently   • Diarrhea     For months     /78 mmHg  Pulse 89  Temp(Src) 36.8 °C (98.2 °F)  Resp 14  Ht 1.575 m (5' 2\")  Wt 84.3 kg (185 lb 13.6 oz)  BMI 33.98 kg/m2  SpO2 99%    "
"Medicated per orders, pt asks, \"Can I get something else for pain too?\"  ERP notified.  "
"Pt hyperventilating, c/o nausea et vomiting.  Sitting on side of bed, leaning over the trash can.  States, \"My stomach is still cramping.\"  ERP notified.  "
DC instructions, f/u et RX X 2 provided et discussed, understanding verbalized.  Denies further needs or questions at this time, ambulates out of ER without difficulty, NAD noted.    
IV established. Blood sent to lab.    
yes

## 2017-10-05 ENCOUNTER — OFFICE VISIT (OUTPATIENT)
Dept: CARDIOLOGY | Facility: MEDICAL CENTER | Age: 32
End: 2017-10-05
Payer: MEDICAID

## 2017-10-05 VITALS
BODY MASS INDEX: 36.99 KG/M2 | SYSTOLIC BLOOD PRESSURE: 122 MMHG | WEIGHT: 201 LBS | DIASTOLIC BLOOD PRESSURE: 90 MMHG | HEART RATE: 82 BPM | HEIGHT: 62 IN | OXYGEN SATURATION: 94 %

## 2017-10-05 DIAGNOSIS — R63.5 WEIGHT GAIN: ICD-10-CM

## 2017-10-05 DIAGNOSIS — I47.10 SVT (SUPRAVENTRICULAR TACHYCARDIA): ICD-10-CM

## 2017-10-05 LAB — EKG IMPRESSION: NORMAL

## 2017-10-05 PROCEDURE — 93000 ELECTROCARDIOGRAM COMPLETE: CPT | Performed by: INTERNAL MEDICINE

## 2017-10-05 PROCEDURE — 99204 OFFICE O/P NEW MOD 45 MIN: CPT | Performed by: INTERNAL MEDICINE

## 2017-10-05 RX ORDER — DICYCLOMINE HCL 20 MG
TABLET ORAL
COMMUNITY
Start: 2017-09-21 | End: 2018-12-26

## 2017-10-05 RX ORDER — NAPROXEN SODIUM 550 MG/1
TABLET ORAL
COMMUNITY
Start: 2017-07-20 | End: 2018-12-26

## 2017-10-05 ASSESSMENT — ENCOUNTER SYMPTOMS
NERVOUS/ANXIOUS: 1
DIZZINESS: 1
NECK PAIN: 1
DIARRHEA: 1
HEADACHES: 1
PALPITATIONS: 1
DEPRESSION: 1
SORE THROAT: 1
BRUISES/BLEEDS EASILY: 1
WEAKNESS: 1
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
## 2017-10-05 NOTE — LETTER
Pritesh Castano MD  [facility]  [place] [facility], [address]  [address], [place] [address]  [place] [address]  Phone: [phone]  Fax: [phone]                  Dear BELEM Glover,    I had the pleasure of seeing your patient Eulalia Rendon ( - 1985) today in clinic for palpitations.  As you recall she is a 32-year-old female with a history of AV la reentry tachycardia status post ablation last year. I believe she may have a recurrence of her tachycardia and also have ordered a 30 day event monitor. I will also be checking her electrolytes, TSH, and an echocardiogram to rule out structural heart disease. I'll keep you posted on the above results and much you know if any further recommendations change. Thank you for your consultation, and I look forward to providing your patients with excellent cardiovascular care.  Please feel free to contact me at any time with if you have any questions.      Best,  Pritesh Castano MD  [facility]

## 2017-10-30 ENCOUNTER — NON-PROVIDER VISIT (OUTPATIENT)
Dept: CARDIOLOGY | Facility: MEDICAL CENTER | Age: 32
End: 2017-10-30
Attending: INTERNAL MEDICINE
Payer: MEDICAID

## 2017-10-30 DIAGNOSIS — I47.10 SVT (SUPRAVENTRICULAR TACHYCARDIA): ICD-10-CM

## 2017-11-30 ENCOUNTER — OFFICE VISIT (OUTPATIENT)
Dept: CARDIOLOGY | Facility: MEDICAL CENTER | Age: 32
End: 2017-11-30
Payer: MEDICAID

## 2017-11-30 ENCOUNTER — TELEPHONE (OUTPATIENT)
Dept: CARDIOLOGY | Facility: MEDICAL CENTER | Age: 32
End: 2017-11-30

## 2017-11-30 VITALS
HEIGHT: 62 IN | BODY MASS INDEX: 38.09 KG/M2 | HEART RATE: 76 BPM | WEIGHT: 207 LBS | OXYGEN SATURATION: 96 % | SYSTOLIC BLOOD PRESSURE: 130 MMHG | DIASTOLIC BLOOD PRESSURE: 78 MMHG

## 2017-11-30 DIAGNOSIS — E66.9 OBESITY (BMI 30-39.9): ICD-10-CM

## 2017-11-30 DIAGNOSIS — I47.10 SVT (SUPRAVENTRICULAR TACHYCARDIA): ICD-10-CM

## 2017-11-30 DIAGNOSIS — F17.210 MODERATE CIGARETTE SMOKER (10-19 PER DAY): ICD-10-CM

## 2017-11-30 PROCEDURE — 99213 OFFICE O/P EST LOW 20 MIN: CPT | Performed by: INTERNAL MEDICINE

## 2017-11-30 RX ORDER — METHOCARBAMOL 500 MG/1
TABLET, FILM COATED ORAL
Refills: 0 | COMMUNITY
Start: 2017-11-21 | End: 2017-11-30
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
## 2017-12-01 ENCOUNTER — TELEPHONE (OUTPATIENT)
Dept: CARDIOLOGY | Facility: MEDICAL CENTER | Age: 32
End: 2017-12-01

## 2017-12-01 PROCEDURE — 93272 ECG/REVIEW INTERPRET ONLY: CPT | Performed by: INTERNAL MEDICINE

## 2017-12-01 NOTE — TELEPHONE ENCOUNTER
Ziopatch result per Dr Castano: No arrhythmias associated with symptoms    Called pt to notify, pt verbalizes understanding

## 2017-12-01 NOTE — TELEPHONE ENCOUNTER
>Baseline transmitted 10/27/2017.  >EOS printed and given to the  Pending signature.  >Appointment in [facility].

## 2017-12-12 ENCOUNTER — HOSPITAL ENCOUNTER (OUTPATIENT)
Dept: CARDIOLOGY | Facility: MEDICAL CENTER | Age: 32
End: 2017-12-12
Attending: INTERNAL MEDICINE
Payer: MEDICAID

## 2017-12-12 DIAGNOSIS — I47.10 SVT (SUPRAVENTRICULAR TACHYCARDIA): ICD-10-CM

## 2017-12-12 LAB
LV EJECT FRACT  99904: 65
LV EJECT FRACT MOD 2C 99903: 76.71
LV EJECT FRACT MOD 4C 99902: 70.54
LV EJECT FRACT MOD BP 99901: 74.75

## 2017-12-12 PROCEDURE — 93306 TTE W/DOPPLER COMPLETE: CPT | Mod: 26 | Performed by: INTERNAL MEDICINE

## 2017-12-12 PROCEDURE — 93306 TTE W/DOPPLER COMPLETE: CPT

## 2017-12-13 ENCOUNTER — TELEPHONE (OUTPATIENT)
Dept: CARDIOLOGY | Facility: MEDICAL CENTER | Age: 32
End: 2017-12-13

## 2017-12-13 NOTE — TELEPHONE ENCOUNTER
----- Message from Pritesh Castano M.D. sent at 12/12/2017  5:22 PM PST -----  The patient's echocardiogram appears completely normal, please call her with the results and confirm they have no further questions.  If they have other results pending, let them know we will contact them when they are available. Thank you!  -Dr. Castano

## 2018-04-20 ENCOUNTER — HOSPITAL ENCOUNTER (EMERGENCY)
Facility: MEDICAL CENTER | Age: 33
End: 2018-04-20
Attending: EMERGENCY MEDICINE
Payer: MEDICAID

## 2018-04-20 VITALS
WEIGHT: 211 LBS | BODY MASS INDEX: 37.39 KG/M2 | TEMPERATURE: 97.5 F | DIASTOLIC BLOOD PRESSURE: 77 MMHG | HEART RATE: 81 BPM | SYSTOLIC BLOOD PRESSURE: 139 MMHG | HEIGHT: 63 IN | OXYGEN SATURATION: 98 % | RESPIRATION RATE: 14 BRPM

## 2018-04-20 DIAGNOSIS — M62.838 MUSCLE SPASM: ICD-10-CM

## 2018-04-20 LAB
ALBUMIN SERPL BCP-MCNC: 4.2 G/DL (ref 3.2–4.9)
ALBUMIN/GLOB SERPL: 1.4 G/DL
ALP SERPL-CCNC: 58 U/L (ref 30–99)
ALT SERPL-CCNC: 20 U/L (ref 2–50)
ANION GAP SERPL CALC-SCNC: 7 MMOL/L (ref 0–11.9)
AST SERPL-CCNC: 19 U/L (ref 12–45)
BASOPHILS # BLD AUTO: 0.2 % (ref 0–1.8)
BASOPHILS # BLD: 0.02 K/UL (ref 0–0.12)
BILIRUB SERPL-MCNC: 0.3 MG/DL (ref 0.1–1.5)
BUN SERPL-MCNC: 14 MG/DL (ref 8–22)
CALCIUM SERPL-MCNC: 9.4 MG/DL (ref 8.5–10.5)
CHLORIDE SERPL-SCNC: 103 MMOL/L (ref 96–112)
CO2 SERPL-SCNC: 26 MMOL/L (ref 20–33)
CREAT SERPL-MCNC: 0.7 MG/DL (ref 0.5–1.4)
EKG IMPRESSION: NORMAL
EOSINOPHIL # BLD AUTO: 0.14 K/UL (ref 0–0.51)
EOSINOPHIL NFR BLD: 1.6 % (ref 0–6.9)
ERYTHROCYTE [DISTWIDTH] IN BLOOD BY AUTOMATED COUNT: 42 FL (ref 35.9–50)
GLOBULIN SER CALC-MCNC: 2.9 G/DL (ref 1.9–3.5)
GLUCOSE SERPL-MCNC: 110 MG/DL (ref 65–99)
HCT VFR BLD AUTO: 41.5 % (ref 37–47)
HGB BLD-MCNC: 14.2 G/DL (ref 12–16)
IMM GRANULOCYTES # BLD AUTO: 0.03 K/UL (ref 0–0.11)
IMM GRANULOCYTES NFR BLD AUTO: 0.4 % (ref 0–0.9)
LYMPHOCYTES # BLD AUTO: 2.47 K/UL (ref 1–4.8)
LYMPHOCYTES NFR BLD: 28.9 % (ref 22–41)
MCH RBC QN AUTO: 31.9 PG (ref 27–33)
MCHC RBC AUTO-ENTMCNC: 34.2 G/DL (ref 33.6–35)
MCV RBC AUTO: 93.3 FL (ref 81.4–97.8)
MONOCYTES # BLD AUTO: 0.67 K/UL (ref 0–0.85)
MONOCYTES NFR BLD AUTO: 7.8 % (ref 0–13.4)
NEUTROPHILS # BLD AUTO: 5.22 K/UL (ref 2–7.15)
NEUTROPHILS NFR BLD: 61.1 % (ref 44–72)
NRBC # BLD AUTO: 0 K/UL
NRBC BLD-RTO: 0 /100 WBC
PLATELET # BLD AUTO: 249 K/UL (ref 164–446)
PMV BLD AUTO: 9.3 FL (ref 9–12.9)
POTASSIUM SERPL-SCNC: 4.1 MMOL/L (ref 3.6–5.5)
PROT SERPL-MCNC: 7.1 G/DL (ref 6–8.2)
RBC # BLD AUTO: 4.45 M/UL (ref 4.2–5.4)
SODIUM SERPL-SCNC: 136 MMOL/L (ref 135–145)
TROPONIN I SERPL-MCNC: <0.01 NG/ML (ref 0–0.04)
WBC # BLD AUTO: 8.6 K/UL (ref 4.8–10.8)

## 2018-04-20 PROCEDURE — 85025 COMPLETE CBC W/AUTO DIFF WBC: CPT

## 2018-04-20 PROCEDURE — 93005 ELECTROCARDIOGRAM TRACING: CPT

## 2018-04-20 PROCEDURE — 99283 EMERGENCY DEPT VISIT LOW MDM: CPT

## 2018-04-20 PROCEDURE — 80053 COMPREHEN METABOLIC PANEL: CPT

## 2018-04-20 PROCEDURE — 84484 ASSAY OF TROPONIN QUANT: CPT

## 2018-04-20 PROCEDURE — 93005 ELECTROCARDIOGRAM TRACING: CPT | Performed by: EMERGENCY MEDICINE

## 2018-04-20 ASSESSMENT — ENCOUNTER SYMPTOMS
SHORTNESS OF BREATH: 0
ABDOMINAL PAIN: 0
DIARRHEA: 1
SORE THROAT: 1
PALPITATIONS: 1
COUGH: 1
VOMITING: 1

## 2018-04-20 NOTE — ED PROVIDER NOTES
"ED Provider Note    Scribed for Lizzette Ferrer D.O. by Carlie Cordoba. 4/20/2018, 12:09 PM.    Primary care provider: BELEM Glover  Means of arrival: Walk-In  History obtained from: Patient  History limited by: None    CHIEF COMPLAINT  Chief Complaint   Patient presents with   • Muscle Spasms     left side of chest     HPI  Eulalia Rendon is a 32 y.o. female who presents to the Emergency Department complaining of muscle spasms to the left side of her chest onset 1 week but lasting hours over the last 2 days. She describes her muscle spasms as feeling like a fluttering sensation \"underneath the bones\". But she states it's not my heart, she's had a history of SVT with ablation and this feels different. Associated symptoms include emesis and diarrhea lasting 24 hours after food poisoning. Patient was diagnosed yesterday at Urgent Care with strep throat and is currently taking antibiotics. Denies abdominal pain, shortness of breath. Confirms SVT and cardiac ablation in 1/2016.     REVIEW OF SYSTEMS - C  Review of Systems   HENT: Positive for sore throat (already diagnosed strep throat).    Respiratory: Positive for cough. Negative for shortness of breath.    Cardiovascular: Positive for palpitations.   Gastrointestinal: Positive for diarrhea and vomiting. Negative for abdominal pain.   All other systems reviewed and are negative.    PAST MEDICAL HISTORY   has a past medical history of Anxiety; Arthritis; CAD (coronary artery disease); Colitis; Depression; Ear infection; Headache(784.0); Infectious disease; Psychiatric disorder; Sinus infection; SVT (supraventricular tachycardia) ([facility]); SVT (supraventricular tachycardia) (CMS-HCC) (12/21/2012); and Urinary tract infection, site not specified.    SURGICAL HISTORY   has a past surgical history that includes gyn surgery; primary c section (2004); repeat c section w tubal ligation (7/15/2013); other cardiac surgery; exploratory laparotomy " "(12/18/2016); and dilation and evacuation (12/18/2016).    SOCIAL HISTORY  Social History   Substance Use Topics   • Smoking status: Current Every Day Smoker     Packs/day: 1.00     Years: 15.00     Types: Cigarettes   • Smokeless tobacco: Never Used   • Alcohol use No      History   Drug Use No       FAMILY HISTORY  Family History   Problem Relation Age of Onset   • Thyroid Mother    • Other Mother      svt   • Hyperlipidemia Mother    • Hyperlipidemia Father    • Hypertension Father    • Diabetes Father      pills   • Alcohol/Drug Father    • Thyroid Sister    • Other Brother      heart murmur   • Stroke Paternal Grandfather    • Heart Attack Paternal Grandfather    • Cancer Paternal Grandfather      prostate       CURRENT MEDICATIONS  No current facility-administered medications on file prior to encounter.      Current Outpatient Prescriptions on File Prior to Encounter   Medication Sig Dispense Refill   • nicotine polacrilex (NICORETTE) 2 MG Gum Take 1 Each by mouth as needed for Smoking Cessation. 90 Each 1   • naproxen sodium (ANAPROX) 550 MG tablet      • dicyclomine (BENTYL) 20 MG Tab      • multivitamin (THERAGRAN) Tab Take 1 Tab by mouth every day.     • escitalopram (LEXAPRO) 10 MG Tab Take 20 mg by mouth every day.         ALLERGIES  Allergies   Allergen Reactions   • Cyclobenzaprine Unspecified     \"Racing heart\"   • Tramadol Hives and Rash     headache       PHYSICAL EXAM  VITAL SIGNS: /77   Pulse 81   Temp 36.4 °C (97.5 °F)   Resp 14   Ht 1.6 m (5' 3\")   Wt 95.7 kg (211 lb)   SpO2 98%   BMI 37.38 kg/m²   Vitals reviewed.    Constitutional: Patient is oriented to person, place, and time. Appears well-developed and well-nourished. No distress.  wearing a mask.  Head: Normocephalic and atraumatic.   Ears: Normal external ears bilaterally.   Eyes: Conjunctivae are normal. Pupils are equal, round, and reactive to light.   Neck: Normal range of motion.   Cardiovascular: Normal rate, regular " rhythm and normal heart sounds.  Pulmonary/Chest: Effort normal and breath sounds normal. No respiratory distress, no wheezes, rhonchi, or rales. No chest wall tenderness.  Abdominal: Soft. Bowel sounds are normal. There is no tenderness, rebound or guarding, or peritoneal signs  Musculoskeletal: No edema and no tenderness.   Neurological: No focal deficits.   Skin: Skin is warm and dry. No erythema. No pallor.   Psychiatric: Patient has a normal mood and affect.     LABS  Results for orders placed or performed during the hospital encounter of 04/20/18   CBC WITH DIFFERENTIAL   Result Value Ref Range    WBC 8.6 4.8 - 10.8 K/uL    RBC 4.45 4.20 - 5.40 M/uL    Hemoglobin 14.2 12.0 - 16.0 g/dL    Hematocrit 41.5 37.0 - 47.0 %    MCV 93.3 81.4 - 97.8 fL    MCH 31.9 27.0 - 33.0 pg    MCHC 34.2 33.6 - 35.0 g/dL    RDW 42.0 35.9 - 50.0 fL    Platelet Count 249 164 - 446 K/uL    MPV 9.3 9.0 - 12.9 fL    Neutrophils-Polys 61.10 44.00 - 72.00 %    Lymphocytes 28.90 22.00 - 41.00 %    Monocytes 7.80 0.00 - 13.40 %    Eosinophils 1.60 0.00 - 6.90 %    Basophils 0.20 0.00 - 1.80 %    Immature Granulocytes 0.40 0.00 - 0.90 %    Nucleated RBC 0.00 /100 WBC    Neutrophils (Absolute) 5.22 2.00 - 7.15 K/uL    Lymphs (Absolute) 2.47 1.00 - 4.80 K/uL    Monos (Absolute) 0.67 0.00 - 0.85 K/uL    Eos (Absolute) 0.14 0.00 - 0.51 K/uL    Baso (Absolute) 0.02 0.00 - 0.12 K/uL    Immature Granulocytes (abs) 0.03 0.00 - 0.11 K/uL    NRBC (Absolute) 0.00 K/uL   COMP METABOLIC PANEL   Result Value Ref Range    Sodium 136 135 - 145 mmol/L    Potassium 4.1 3.6 - 5.5 mmol/L    Chloride 103 96 - 112 mmol/L    Co2 26 20 - 33 mmol/L    Anion Gap 7.0 0.0 - 11.9    Glucose 110 (H) 65 - 99 mg/dL    Bun 14 8 - 22 mg/dL    Creatinine 0.70 0.50 - 1.40 mg/dL    Calcium 9.4 8.5 - 10.5 mg/dL    AST(SGOT) 19 12 - 45 U/L    ALT(SGPT) 20 2 - 50 U/L    Alkaline Phosphatase 58 30 - 99 U/L    Total Bilirubin 0.3 0.1 - 1.5 mg/dL    Albumin 4.2 3.2 - 4.9 g/dL     Total Protein 7.1 6.0 - 8.2 g/dL    Globulin 2.9 1.9 - 3.5 g/dL    A-G Ratio 1.4 g/dL   TROPONIN   Result Value Ref Range    Troponin I <0.01 0.00 - 0.04 ng/mL   ESTIMATED GFR   Result Value Ref Range    GFR If African American >60 >60 mL/min/1.73 m 2    GFR If Non African American >60 >60 mL/min/1.73 m 2   EKG (ER)   Result Value Ref Range    Report       [facility] Emergency Dept.    Test Date:  2018  Pt Name:    ANNA CIFUENTES               Department: ER  MRN:        [id number]                      Room:  Gender:     Female                       Technician: [id number]  :        1985                   Requested By:ER TRIAGE PROTOCOL  Order #:    [id number]                    Reading MD:    Measurements  Intervals                                Axis  Rate:       76                           P:          57  ID:         [id number]                          QRS:        52  QRSD:       88                           T:          25  QT:         364  QTc:        410    Interpretive Statements  SINUS RHYTHM  CONSIDER LEFT ATRIAL ABNORMALITY  BASELINE WANDER IN LEAD(S) I,III,aVL  Compared to ECG 10/05/2017 11:13:46  No significant changes       All labs reviewed by me.    EKG Interpretation  Interpreted by me at 12:28 PM     Rhythm: normal sinus   Rate: 76  Axis: normal  Ectopy: none  Conduction: normal  ST Segments: no acute change  T Waves: no acute change  Q Waves: none    Clinical Impression: no acute changes from EKG on 10/2017    COURSE & MEDICAL DECISION MAKING  Nursing notes, VS, PMSFHx reviewed in chart.    Obtained and reviewed past medical records from 2017 when she last saw Dr. CHAPIS Castano (Cardiology) as a follow-up status post AVNRT and status post ablation. Her last ED visit was 2017 with shoulder pain.     12:09 PM - Patient seen and examined at bedside. This is an overall well-appearing 32-year-old female presents with normal vital signs. She is not tachycardic. She has a  normal-appearing EKG was done per nursing protocol. She does report a history of vomiting over the last several days, last time 4 days ago. Discussed the treatment care plan with the patient which is to place her on a heart monitor, look at her electrolyte and EKG. Ordered CBC with differential, CMP, troponin to evaluate her symptoms. Differentials include but are not limited to: arrhythmia/SVT, electrolyte disturbance    2:25 PM - Recheck. Updated patient on her lab results indicating electrolytes, sodium potassium, kidney function, liver and hydration are all normal. There are no signs of infection and her EKG and labs look normal. She continues to have a normal heart rate. She had no significant symptoms while here. But, that this is musculoskeletal in nature. Further emergent intervention necessary. She was given ED return precautions and discharged home at this time.    The patient will return for new or worsening symptoms and is stable at the time of discharge.    The patient is referred to a primary physician for blood pressure management, diabetic screening, and for all other preventative health concerns.    DISPOSITION:  Patient will be discharged home in stable condition.    FOLLOW UP:  [facility], Emergency Dept  [address] [id number] [phone]    If symptoms worsen    [facility] GloverRBalajiN.  [address]  [facility] [id number] [phone]    In 3 days        OUTPATIENT MEDICATIONS:  Discharge Medication List as of 4/20/2018  2:55 PM            FINAL IMPRESSION  1. Muscle spasm     chest wall     Carlie VILLALOBOS (Stacy), am scribing for, and in the presence of, Lizzette Ferrer D.O..    Electronically signed by: Carlie Hester), 4/20/2018    Lizzette VILLALOBOS D.O. personally performed the services described in this documentation, as scribed by Carlie Cordoba in my presence, and it is both accurate and complete.    The note  accurately reflects work and decisions made by me.  Lizzette Ferrer  4/20/2018  3:57 PM

## 2018-04-20 NOTE — ED NOTES
"Pt ambulates to triage from EKG with c/c \"muscle spasms in my left chest under my left breast\" x1 week.  \"Nothing makes it feel better or worse.\"  Denies trauma.  A&ox4.  Pt to lobby & advised to inform RN of any changes.    "

## 2018-12-26 ENCOUNTER — HOSPITAL ENCOUNTER (EMERGENCY)
Facility: MEDICAL CENTER | Age: 33
End: 2018-12-26
Attending: EMERGENCY MEDICINE
Payer: MEDICAID

## 2018-12-26 VITALS
BODY MASS INDEX: 38.01 KG/M2 | TEMPERATURE: 96.9 F | HEIGHT: 62 IN | WEIGHT: 206.57 LBS | OXYGEN SATURATION: 100 % | DIASTOLIC BLOOD PRESSURE: 89 MMHG | RESPIRATION RATE: 16 BRPM | SYSTOLIC BLOOD PRESSURE: 121 MMHG | HEART RATE: 83 BPM

## 2018-12-26 DIAGNOSIS — L08.9 ABRASION OF NOSE WITH INFECTION, INITIAL ENCOUNTER: ICD-10-CM

## 2018-12-26 DIAGNOSIS — S00.31XA ABRASION OF NOSE WITH INFECTION, INITIAL ENCOUNTER: ICD-10-CM

## 2018-12-26 DIAGNOSIS — J04.0 LARYNGITIS: ICD-10-CM

## 2018-12-26 PROCEDURE — 99284 EMERGENCY DEPT VISIT MOD MDM: CPT

## 2018-12-26 RX ORDER — BENZONATATE 100 MG/1
100 CAPSULE ORAL 3 TIMES DAILY PRN
Qty: 30 CAP | Refills: 0 | Status: SHIPPED | OUTPATIENT
Start: 2018-12-26 | End: 2019-07-30

## 2018-12-26 RX ORDER — TRAMADOL HYDROCHLORIDE 50 MG/1
50 TABLET ORAL EVERY 4 HOURS PRN
Qty: 20 TAB | Refills: 0 | Status: SHIPPED | OUTPATIENT
Start: 2018-12-26 | End: 2019-01-02

## 2018-12-26 RX ORDER — PANTOPRAZOLE SODIUM 40 MG/1
40 TABLET, DELAYED RELEASE ORAL 2 TIMES DAILY
COMMUNITY
End: 2020-01-05

## 2018-12-26 NOTE — ED TRIAGE NOTES
Ambulatory to triage with report of  Chief Complaint   Patient presents with   • Nasal Congestion     reports dizziness and lightheaded when turning head, horse voice, and chest gongestion.  reports post nasal drip for past year, but current symptoms onset today.  reports possible fever of unknown degree

## 2018-12-26 NOTE — ED NOTES
RN attempted to assess pt and pt reports ERP already told pt she gets to go home and would not answer assessment questions. Pt continually on phone ignoring RN

## 2018-12-26 NOTE — ED PROVIDER NOTES
ED Provider Note    Scribed for Ej Vang M.D. by Baldomero Posey. 12/26/2018  10:19 AM    Primary care provider: BELEM Glover  Means of arrival: walk in  History obtained from: Patient  History limited by: None    CHIEF COMPLAINT  Chief Complaint   Patient presents with   • Nasal Congestion     reports dizziness and lightheaded when turning head, horse voice, and chest gongestion.  reports post nasal drip for past year, but current symptoms onset today.  reports possible fever of unknown degree       HPI  Eulalia Rendon is a 33 y.o. female who presents to the Emergency Department complaining of dizziness starting yesteday. She describes her dizziness as room spinning that comes on when turning her head. Patient reports associated congestion, hoarse voice, subjective fever, ear pain, throat pain. She reports a history of post nasal drip that is intermittently painful and currently being followed by her primary for. Patient states that she is intermittently prescribed antibiotics for this condition and has a history of Cdiff, but is not currently on a regimen. She states that her symptoms are worse than normal today, prompting her to come to the ED for evaluation. She also has a history of CAD, anxiety, depression, half pack per day cigarette use. Patient denies vomiting, shortness of breath.     REVIEW OF SYSTEMS  Pertinent negatives include no vomiting, shortness of breath. As above, all other systems reviewed and are negative.   See HPI for further details.     PAST MEDICAL HISTORY   has a past medical history of Anxiety; Arthritis; CAD (coronary artery disease); Colitis; Depression; Ear infection; Headache(784.0); Infectious disease; Psychiatric disorder; Sinus infection; SVT (supraventricular tachycardia) (HCC); SVT (supraventricular tachycardia) (HCC) (12/21/2012); and Urinary tract infection, site not specified.    SURGICAL HISTORY   has a past surgical history that includes gyn surgery;  "primary c section (2004); repeat c section w tubal ligation (7/15/2013); other cardiac surgery; exploratory laparotomy (12/18/2016); and dilation and evacuation (12/18/2016).    SOCIAL HISTORY  Social History   Substance Use Topics   • Smoking status: Current Every Day Smoker     Packs/day: 1.00     Years: 15.00     Types: Cigarettes   • Smokeless tobacco: Never Used   • Alcohol use No      History   Drug Use No       FAMILY HISTORY  Family History   Problem Relation Age of Onset   • Thyroid Mother    • Other Mother         svt   • Hyperlipidemia Mother    • Hyperlipidemia Father    • Hypertension Father    • Diabetes Father         pills   • Alcohol/Drug Father    • Thyroid Sister    • Other Brother         heart murmur   • Stroke Paternal Grandfather    • Heart Attack Paternal Grandfather    • Cancer Paternal Grandfather         prostate       CURRENT MEDICATIONS  Home Medications     Reviewed by Julieta Burton R.N. (Registered Nurse) on 12/26/18 at 0956  Med List Status: Partial   Medication Last Dose Status   escitalopram (LEXAPRO) 10 MG Tab 12/26/2018 Active   pantoprazole (PROTONIX) 40 MG Tablet Delayed Response 12/26/2018 Active                ALLERGIES  Allergies   Allergen Reactions   • Cyclobenzaprine Unspecified     \"Racing heart\"   • Tramadol Hives and Rash     headache       PHYSICAL EXAM  VITAL SIGNS: /89   Pulse 83   Temp 36.1 °C (96.9 °F) (Temporal)   Resp 16   Ht 1.575 m (5' 2\")   Wt 93.7 kg (206 lb 9.1 oz)   LMP 11/28/2018 (Approximate)   SpO2 100%   BMI 37.78 kg/m²     Constitutional: Well developed, Well nourished, No acute distress, Non-toxic appearance.   HENT: Normocephalic, Atraumatic, Bilateral external ears normal, Oropharynx is erythematous. Mucous membranes are moist. No oral exudates or nasal discharge. Old scarring to the left TM, Right TM normal. Excoriated left sided septom anteriorly without vesicles or pustule.   Eyes: Pupils are equal round and reactive, EOMI, " Conjunctiva normal, No discharge.   Neck: Normal range of motion, Supple, No stridor. No meningismus. Mild anterior soft tissue tenderness.   Lymphatic: No lymphadenopathy noted.   Cardiovascular: Regular rate and rhythm without murmur rub or gallop.  Thorax & Lungs: Clear breath sounds bilaterally without wheezes, rhonchi or rales. There is no chest wall tenderness.   Abdomen: Soft non-tender non-distended. There is no rebound or guarding. No organomegaly is appreciated. Bowel sounds are normal.  Skin: Normal without rash.   Back: No CVA or spinal tenderness.   Extremities: Intact distal pulses, No edema, No tenderness, No cyanosis, No clubbing. Capillary refill is less than 2 seconds.  Musculoskeletal: Good range of motion in all major joints. No tenderness to palpation or major deformities noted.   Neurologic: Alert & oriented x 3, Normal motor function, Normal sensory function, No focal deficits noted. Reflexes are normal.  Psychiatric: Affect normal, Judgment normal, Mood normal. There is no suicidal ideation or patient reported hallucinations.     DIAGNOSTIC STUDIES / PROCEDURES    COURSE & MEDICAL DECISION MAKING  Nursing notes, VS, PMSFHx reviewed in chart.    10:19 AM Patient seen and examined at bedside. Discussed unlikeliness of having strep throat based on her presentation and physical exam. She presents today with signs and symptoms consistent with viral URI. I encouraged her to maintain adequate hydration throughout the course of her illness. Patient is instructed to use Tylenol for pain control. In terms of the post nasal drip, she is encouraged to obtain Mucinex over the counter to improve her symptoms. She will also be prescribed Bactroban for coverage. Patient verbalizes understanding and agreement to this plan of care.     Patient has had high blood pressure while in the emergency department, felt likely secondary to medical condition. Counseled patient to monitor blood pressure at home and follow  up with primary care physician.      The patient will return for new or worsening symptoms and is stable at the time of discharge.    DISPOSITION:  Patient will be discharged home in stable condition.    OUTPATIENT MEDICATIONS:  New Prescriptions    MUPIROCIN (BACTROBAN) 2 % OINTMENT    Apply 1 Application to affected area(s) 3 times a day.     FINAL IMPRESSION  1. Abrasion of nose with infection, initial encounter    2. Laryngitis          Baldomero VILLALOBOS (Scribe), am scribing for, and in the presence of, Ej Vang M.D..    Electronically signed by: Baldomero Posey (Scribe), 12/26/2018    IEj M.D. personally performed the services described in this documentation, as scribed by Baldomero Posey in my presence, and it is both accurate and complete. C    The note accurately reflects work and decisions made by me.  Ej Vang  12/26/2018  11:13 AM

## 2019-07-30 ENCOUNTER — HOSPITAL ENCOUNTER (EMERGENCY)
Facility: MEDICAL CENTER | Age: 34
End: 2019-07-30
Attending: EMERGENCY MEDICINE

## 2019-07-30 VITALS
RESPIRATION RATE: 18 BRPM | DIASTOLIC BLOOD PRESSURE: 78 MMHG | WEIGHT: 215.39 LBS | HEIGHT: 63 IN | SYSTOLIC BLOOD PRESSURE: 129 MMHG | HEART RATE: 80 BPM | TEMPERATURE: 97.9 F | OXYGEN SATURATION: 100 % | BODY MASS INDEX: 38.16 KG/M2

## 2019-07-30 DIAGNOSIS — N39.0 ACUTE UTI: ICD-10-CM

## 2019-07-30 LAB
APPEARANCE UR: ABNORMAL
BACTERIA #/AREA URNS HPF: ABNORMAL /HPF
BILIRUB UR QL STRIP.AUTO: NEGATIVE
COLOR UR: YELLOW
EPI CELLS #/AREA URNS HPF: ABNORMAL /HPF
GLUCOSE UR STRIP.AUTO-MCNC: NEGATIVE MG/DL
KETONES UR STRIP.AUTO-MCNC: NEGATIVE MG/DL
LEUKOCYTE ESTERASE UR QL STRIP.AUTO: NEGATIVE
MICRO URNS: ABNORMAL
MUCOUS THREADS #/AREA URNS HPF: ABNORMAL /HPF
NITRITE UR QL STRIP.AUTO: POSITIVE
PH UR STRIP.AUTO: 5.5 [PH] (ref 5–8)
PROT UR QL STRIP: 30 MG/DL
RBC # URNS HPF: ABNORMAL /HPF
RBC UR QL AUTO: ABNORMAL
SP GR UR REFRACTOMETRY: 1.02
WBC #/AREA URNS HPF: ABNORMAL /HPF

## 2019-07-30 PROCEDURE — 81001 URINALYSIS AUTO W/SCOPE: CPT

## 2019-07-30 PROCEDURE — 99284 EMERGENCY DEPT VISIT MOD MDM: CPT

## 2019-07-30 RX ORDER — PHENAZOPYRIDINE HYDROCHLORIDE 200 MG/1
200 TABLET, FILM COATED ORAL 3 TIMES DAILY PRN
Qty: 6 TAB | Refills: 0 | Status: SHIPPED | OUTPATIENT
Start: 2019-07-30 | End: 2020-01-05

## 2019-07-30 RX ORDER — ESCITALOPRAM OXALATE 20 MG/1
20 TABLET ORAL DAILY
Status: SHIPPED | COMMUNITY
End: 2019-07-31

## 2019-07-30 RX ORDER — IBUPROFEN 200 MG
200-800 TABLET ORAL EVERY 6 HOURS PRN
Status: SHIPPED | COMMUNITY
End: 2020-01-05

## 2019-07-30 RX ORDER — CEPHALEXIN 500 MG/1
500 CAPSULE ORAL 4 TIMES DAILY
Qty: 12 CAP | Refills: 0 | Status: SHIPPED | OUTPATIENT
Start: 2019-07-30 | End: 2019-07-31

## 2019-07-30 NOTE — ED NOTES
Pt is complaining of painful urination with scant amounts of blood as well x1 day. She is also complaining of LLQ pain. Pt denies n/v

## 2019-07-30 NOTE — ED NOTES
Med rec updated and complete  Allergies reviewed  Pt reports that she stopped taking her LEXAPRO 20MG about 2 weeks on her own.  Pt reports no antibiotics in the last 2 weeks
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
## 2019-07-31 ENCOUNTER — APPOINTMENT (OUTPATIENT)
Dept: RADIOLOGY | Facility: MEDICAL CENTER | Age: 34
End: 2019-07-31
Attending: EMERGENCY MEDICINE

## 2019-07-31 ENCOUNTER — HOSPITAL ENCOUNTER (EMERGENCY)
Facility: MEDICAL CENTER | Age: 34
End: 2019-07-31
Attending: EMERGENCY MEDICINE

## 2019-07-31 VITALS
TEMPERATURE: 97.6 F | WEIGHT: 213.85 LBS | RESPIRATION RATE: 15 BRPM | HEIGHT: 63 IN | OXYGEN SATURATION: 96 % | SYSTOLIC BLOOD PRESSURE: 122 MMHG | BODY MASS INDEX: 37.89 KG/M2 | HEART RATE: 69 BPM | DIASTOLIC BLOOD PRESSURE: 64 MMHG

## 2019-07-31 DIAGNOSIS — N30.01 ACUTE CYSTITIS WITH HEMATURIA: ICD-10-CM

## 2019-07-31 LAB
ALBUMIN SERPL BCP-MCNC: 4.1 G/DL (ref 3.2–4.9)
ALBUMIN/GLOB SERPL: 1.3 G/DL
ALP SERPL-CCNC: 75 U/L (ref 30–99)
ALT SERPL-CCNC: 26 U/L (ref 2–50)
ANION GAP SERPL CALC-SCNC: 10 MMOL/L (ref 0–11.9)
APPEARANCE UR: ABNORMAL
AST SERPL-CCNC: 23 U/L (ref 12–45)
BACTERIA #/AREA URNS HPF: ABNORMAL /HPF
BASOPHILS # BLD AUTO: 0.3 % (ref 0–1.8)
BASOPHILS # BLD: 0.03 K/UL (ref 0–0.12)
BILIRUB SERPL-MCNC: 0.7 MG/DL (ref 0.1–1.5)
BILIRUB UR QL STRIP.AUTO: NEGATIVE
BUN SERPL-MCNC: 16 MG/DL (ref 8–22)
CALCIUM SERPL-MCNC: 9.2 MG/DL (ref 8.4–10.2)
CHLORIDE SERPL-SCNC: 102 MMOL/L (ref 96–112)
CO2 SERPL-SCNC: 24 MMOL/L (ref 20–33)
COLOR UR: YELLOW
CREAT SERPL-MCNC: 0.96 MG/DL (ref 0.5–1.4)
EOSINOPHIL # BLD AUTO: 0.21 K/UL (ref 0–0.51)
EOSINOPHIL NFR BLD: 1.9 % (ref 0–6.9)
EPI CELLS #/AREA URNS HPF: ABNORMAL /HPF
ERYTHROCYTE [DISTWIDTH] IN BLOOD BY AUTOMATED COUNT: 41.1 FL (ref 35.9–50)
GLOBULIN SER CALC-MCNC: 3.2 G/DL (ref 1.9–3.5)
GLUCOSE SERPL-MCNC: 96 MG/DL (ref 65–99)
GLUCOSE UR STRIP.AUTO-MCNC: NEGATIVE MG/DL
HCG SERPL QL: NEGATIVE
HCT VFR BLD AUTO: 41.2 % (ref 37–47)
HGB BLD-MCNC: 13.8 G/DL (ref 12–16)
IMM GRANULOCYTES # BLD AUTO: 0.05 K/UL (ref 0–0.11)
IMM GRANULOCYTES NFR BLD AUTO: 0.5 % (ref 0–0.9)
KETONES UR STRIP.AUTO-MCNC: NEGATIVE MG/DL
LACTATE BLD-SCNC: 1 MMOL/L (ref 0.5–2)
LEUKOCYTE ESTERASE UR QL STRIP.AUTO: ABNORMAL
LIPASE SERPL-CCNC: 39 U/L (ref 7–58)
LYMPHOCYTES # BLD AUTO: 3.31 K/UL (ref 1–4.8)
LYMPHOCYTES NFR BLD: 30.2 % (ref 22–41)
MCH RBC QN AUTO: 30.4 PG (ref 27–33)
MCHC RBC AUTO-ENTMCNC: 33.5 G/DL (ref 33.6–35)
MCV RBC AUTO: 90.7 FL (ref 81.4–97.8)
MICRO URNS: ABNORMAL
MONOCYTES # BLD AUTO: 0.82 K/UL (ref 0–0.85)
MONOCYTES NFR BLD AUTO: 7.5 % (ref 0–13.4)
NEUTROPHILS # BLD AUTO: 6.53 K/UL (ref 2–7.15)
NEUTROPHILS NFR BLD: 59.6 % (ref 44–72)
NITRITE UR QL STRIP.AUTO: POSITIVE
NRBC # BLD AUTO: 0 K/UL
NRBC BLD-RTO: 0 /100 WBC
PH UR STRIP.AUTO: 6 [PH] (ref 5–8)
PLATELET # BLD AUTO: 286 K/UL (ref 164–446)
PMV BLD AUTO: 9.3 FL (ref 9–12.9)
POTASSIUM SERPL-SCNC: 3.3 MMOL/L (ref 3.6–5.5)
PROT SERPL-MCNC: 7.3 G/DL (ref 6–8.2)
PROT UR QL STRIP: 30 MG/DL
RBC # BLD AUTO: 4.54 M/UL (ref 4.2–5.4)
RBC # URNS HPF: ABNORMAL /HPF
RBC UR QL AUTO: ABNORMAL
SODIUM SERPL-SCNC: 136 MMOL/L (ref 135–145)
SP GR UR STRIP.AUTO: 1.02
WBC # BLD AUTO: 11 K/UL (ref 4.8–10.8)
WBC #/AREA URNS HPF: ABNORMAL /HPF

## 2019-07-31 PROCEDURE — 83605 ASSAY OF LACTIC ACID: CPT

## 2019-07-31 PROCEDURE — 84703 CHORIONIC GONADOTROPIN ASSAY: CPT

## 2019-07-31 PROCEDURE — 700117 HCHG RX CONTRAST REV CODE 255: Performed by: EMERGENCY MEDICINE

## 2019-07-31 PROCEDURE — 87086 URINE CULTURE/COLONY COUNT: CPT

## 2019-07-31 PROCEDURE — 36415 COLL VENOUS BLD VENIPUNCTURE: CPT

## 2019-07-31 PROCEDURE — A9270 NON-COVERED ITEM OR SERVICE: HCPCS | Performed by: EMERGENCY MEDICINE

## 2019-07-31 PROCEDURE — 99285 EMERGENCY DEPT VISIT HI MDM: CPT

## 2019-07-31 PROCEDURE — 80053 COMPREHEN METABOLIC PANEL: CPT

## 2019-07-31 PROCEDURE — 83690 ASSAY OF LIPASE: CPT

## 2019-07-31 PROCEDURE — 96375 TX/PRO/DX INJ NEW DRUG ADDON: CPT

## 2019-07-31 PROCEDURE — 700105 HCHG RX REV CODE 258: Performed by: EMERGENCY MEDICINE

## 2019-07-31 PROCEDURE — 700111 HCHG RX REV CODE 636 W/ 250 OVERRIDE (IP): Performed by: EMERGENCY MEDICINE

## 2019-07-31 PROCEDURE — 700102 HCHG RX REV CODE 250 W/ 637 OVERRIDE(OP): Performed by: EMERGENCY MEDICINE

## 2019-07-31 PROCEDURE — 85025 COMPLETE CBC W/AUTO DIFF WBC: CPT

## 2019-07-31 PROCEDURE — 74177 CT ABD & PELVIS W/CONTRAST: CPT

## 2019-07-31 PROCEDURE — 96365 THER/PROPH/DIAG IV INF INIT: CPT

## 2019-07-31 PROCEDURE — 81001 URINALYSIS AUTO W/SCOPE: CPT

## 2019-07-31 RX ORDER — KETOROLAC TROMETHAMINE 30 MG/ML
15 INJECTION, SOLUTION INTRAMUSCULAR; INTRAVENOUS ONCE
Status: COMPLETED | OUTPATIENT
Start: 2019-07-31 | End: 2019-07-31

## 2019-07-31 RX ORDER — AMOXICILLIN AND CLAVULANATE POTASSIUM 875; 125 MG/1; MG/1
1 TABLET, FILM COATED ORAL 2 TIMES DAILY
Qty: 20 TAB | Refills: 0 | Status: SHIPPED | OUTPATIENT
Start: 2019-07-31 | End: 2019-08-10

## 2019-07-31 RX ORDER — POTASSIUM CHLORIDE 20 MEQ/1
40 TABLET, EXTENDED RELEASE ORAL ONCE
Status: COMPLETED | OUTPATIENT
Start: 2019-07-31 | End: 2019-07-31

## 2019-07-31 RX ORDER — CEPHALEXIN 500 MG/1
500 CAPSULE ORAL 4 TIMES DAILY
Status: SHIPPED | COMMUNITY
Start: 2019-07-30 | End: 2019-07-31

## 2019-07-31 RX ORDER — ACETAMINOPHEN 500 MG
1000 TABLET ORAL 2 TIMES DAILY PRN
Status: SHIPPED | COMMUNITY
End: 2020-01-05

## 2019-07-31 RX ADMIN — POTASSIUM CHLORIDE 40 MEQ: 1500 TABLET, EXTENDED RELEASE ORAL at 11:17

## 2019-07-31 RX ADMIN — CEFTRIAXONE SODIUM 2 G: 2 INJECTION, POWDER, FOR SOLUTION INTRAMUSCULAR; INTRAVENOUS at 10:44

## 2019-07-31 RX ADMIN — KETOROLAC TROMETHAMINE 15 MG: 30 INJECTION, SOLUTION INTRAMUSCULAR at 08:24

## 2019-07-31 RX ADMIN — IOHEXOL 100 ML: 350 INJECTION, SOLUTION INTRAVENOUS at 09:56

## 2019-07-31 ASSESSMENT — PAIN DESCRIPTION - DESCRIPTORS: DESCRIPTORS: SHARP

## 2019-07-31 NOTE — ED NOTES
Reviewed discharge instructions and printed prescription x 1 w/ pt, verbalized understanding to information provided including follow up care and return precautions, denied questions/concerns.  Pt thanked RN for care, ambulated from ED without difficulty.

## 2019-07-31 NOTE — ED NOTES
Med Rec updated and complete per pt at bedside  Allergies have been verified       Pt reports that she is on  a 3 day course of KEFLEX that was started on 07/30/2019
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
## 2019-07-31 NOTE — DISCHARGE INSTRUCTIONS
Return to the ER for any worsening abdominal pain, changing abdominal pain, fevers over 100.4, shaking chills, back pain, nausea, vomiting, worsening pain with urination, increased blood in urine, cloudy or foul-smelling urine, or for any concerns.    Stop taking the Keflex and start taking the Augmentin..    Drink plenty of fluids!    Follow-up at the [facility] clinic within the next 1 to 2 days.  Please call today to schedule your follow-up appointment.

## 2019-07-31 NOTE — ED TRIAGE NOTES
"Chief Complaint   Patient presents with   • Abdominal Pain     here recently diagnosed with UTI, sent home with meds, not getting better   • Blood in Urine     pt states she is passing clots and has pink tinged urine.       /83   Pulse 94   Temp 36.6 °C (97.8 °F) (Temporal)   Resp 18   Ht 1.6 m (5' 3\")   Wt 97 kg (213 lb 13.5 oz)   LMP 07/24/2019 (Exact Date)   SpO2 94%   Breastfeeding? No   BMI 37.88 kg/m²     "

## 2019-08-02 LAB
BACTERIA UR CULT: ABNORMAL
BACTERIA UR CULT: ABNORMAL
SIGNIFICANT IND 70042: ABNORMAL
SITE SITE: ABNORMAL
SOURCE SOURCE: ABNORMAL

## 2019-08-04 NOTE — PROGRESS NOTES
"ED Positive Culture Follow-up/Notification Note:   Date: 8/4/19    Patient seen in the ED on 7/31/2019 for R abdominal pain and hematuria after being diagnosed with UTI one day ago and prescribed cephalexin.     1. Acute cystitis with hematuria       Discharge Medication List as of 7/31/2019 12:01 PM      START taking these medications    Details   amoxicillin-clavulanate (AUGMENTIN) 875-125 MG Tab Take 1 Tab by mouth 2 times a day for 10 days., Disp-20 Tab, R-0, Print Rx Paper             Allergies: Patient has no known allergies.     Vitals:    07/31/19 0655 07/31/19 0657 07/31/19 1135 07/31/19 1203   BP:  141/83 125/80 122/64   Pulse:  94 71 69   Resp:  18 17 15   Temp:  36.6 °C (97.8 °F) 36.4 °C (97.6 °F)    TempSrc:  Temporal Temporal    SpO2:  94% 98% 96%   Weight:  97 kg (213 lb 13.5 oz)     Height: 1.6 m (5' 3\") 1.6 m (5' 3\")         Final cultures:   Results     Procedure Component Value Units Date/Time    URINE CULTURE [[id number]]  (Abnormal) Collected:  07/31/19 0712    Order Status:  Completed Specimen:  Urine, Clean Catch Updated:  08/02/19 0741     Significant Indicator POS     Source UR     Site URINE, CLEAN CATCH     Culture Result Growth noted after further incubation, see below for  organism identification.        Probable Gardnerella vaginalis  10-50,000 cfu/mL      Narrative:       Indication for culture:->Patient WITHOUT an indwelling Sanchez  catheter in place with new onset of Dysuria, Frequency,  Urgency, and/or Suprapubic pain  Indication for culture:->Patient WITHOUT an indwelling Sanchez    URINALYSIS (UA) [[id number]]  (Abnormal) Collected:  07/31/19 0712    Order Status:  Completed Specimen:  Urine Updated:  07/31/19 2234     Color Yellow     Character Hazy     Specific Gravity 1.020     Ph 6.0     Glucose Negative mg/dL      Ketones Negative mg/dL      Protein 30 mg/dL      Bilirubin Negative     Nitrite Positive     Leukocyte Esterase Trace     Occult Blood Large     Micro Urine Req " Microscopic    Narrative:       Indication for culture:->Patient WITHOUT an indwelling Sanchez  catheter in place with new onset of Dysuria, Frequency,  Urgency, and/or Suprapubic pain          Plan:   Garnerella vaginalis in the urine is a common urogenital colonizer and not likely a true urinary pathogen without corresponding symptoms.  Augmentin is a good empiric treatment for potential pathogens of UTI.      No changes required based upon culture result.    Madison Cheney

## 2020-01-05 ENCOUNTER — HOSPITAL ENCOUNTER (EMERGENCY)
Facility: MEDICAL CENTER | Age: 35
End: 2020-01-05

## 2020-01-05 VITALS
SYSTOLIC BLOOD PRESSURE: 147 MMHG | DIASTOLIC BLOOD PRESSURE: 103 MMHG | RESPIRATION RATE: 18 BRPM | OXYGEN SATURATION: 100 % | TEMPERATURE: 96.8 F | BODY MASS INDEX: 35.66 KG/M2 | HEART RATE: 115 BPM | HEIGHT: 63 IN | WEIGHT: 201.28 LBS

## 2020-01-05 PROCEDURE — 302449 STATCHG TRIAGE ONLY (STATISTIC)

## 2020-01-06 NOTE — ED NOTES
Patient called from front lobby and senior lounge to go back to room with no response.  Will call again in 5 mins.

## 2020-01-06 NOTE — ED TRIAGE NOTES
33 y/o female ambulatory to triage with c/o black stools x several weeks, pt also reports generalized weakness & nausea that began today. She states she has hemorrhoids as well causing her pain with bowel movements.

## 2020-05-15 ENCOUNTER — NON-PROVIDER VISIT (OUTPATIENT)
Dept: URGENT CARE | Facility: PHYSICIAN GROUP | Age: 35
End: 2020-05-15

## 2020-05-15 DIAGNOSIS — Z02.1 PRE-EMPLOYMENT DRUG SCREENING: ICD-10-CM

## 2020-05-15 LAB
AMP AMPHETAMINE: NEGATIVE
COC COCAINE: NEGATIVE
INT CON NEG: NORMAL
INT CON POS: NORMAL
MET METHAMPHETAMINES: NEGATIVE
OPI OPIATES: NEGATIVE
PCP PHENCYCLIDINE: NEGATIVE
POC DRUG COMMENT 753798-OCCUPATIONAL HEALTH: NEGATIVE
THC: NEGATIVE

## 2020-05-15 PROCEDURE — 80305 DRUG TEST PRSMV DIR OPT OBS: CPT | Performed by: PHYSICIAN ASSISTANT

## 2021-02-09 ENCOUNTER — HOSPITAL ENCOUNTER (OUTPATIENT)
Dept: LAB | Facility: MEDICAL CENTER | Age: 36
End: 2021-02-09
Payer: COMMERCIAL

## 2021-02-09 LAB
COVID ORDER STATUS COVID19: NORMAL
SARS-COV-2 RNA RESP QL NAA+PROBE: NOTDETECTED
SPECIMEN SOURCE: NORMAL

## 2021-09-01 ENCOUNTER — HOSPITAL ENCOUNTER (EMERGENCY)
Facility: MEDICAL CENTER | Age: 36
End: 2021-09-01
Attending: EMERGENCY MEDICINE
Payer: COMMERCIAL

## 2021-09-01 VITALS
WEIGHT: 209.88 LBS | OXYGEN SATURATION: 100 % | DIASTOLIC BLOOD PRESSURE: 81 MMHG | HEART RATE: 71 BPM | HEIGHT: 63 IN | BODY MASS INDEX: 37.19 KG/M2 | RESPIRATION RATE: 16 BRPM | SYSTOLIC BLOOD PRESSURE: 123 MMHG | TEMPERATURE: 98 F

## 2021-09-01 DIAGNOSIS — K12.2 UVULITIS: ICD-10-CM

## 2021-09-01 LAB
ALBUMIN SERPL BCP-MCNC: 4 G/DL (ref 3.2–4.9)
ALBUMIN/GLOB SERPL: 1.5 G/DL
ALP SERPL-CCNC: 74 U/L (ref 30–99)
ALT SERPL-CCNC: 16 U/L (ref 2–50)
ANION GAP SERPL CALC-SCNC: 11 MMOL/L (ref 7–16)
APPEARANCE UR: CLEAR
AST SERPL-CCNC: 14 U/L (ref 12–45)
BACTERIA #/AREA URNS HPF: ABNORMAL /HPF
BASOPHILS # BLD AUTO: 0.2 % (ref 0–1.8)
BASOPHILS # BLD: 0.02 K/UL (ref 0–0.12)
BILIRUB SERPL-MCNC: 0.2 MG/DL (ref 0.1–1.5)
BILIRUB UR QL STRIP.AUTO: NEGATIVE
BUN SERPL-MCNC: 14 MG/DL (ref 8–22)
CALCIUM SERPL-MCNC: 9.3 MG/DL (ref 8.4–10.2)
CHLORIDE SERPL-SCNC: 107 MMOL/L (ref 96–112)
CO2 SERPL-SCNC: 23 MMOL/L (ref 20–33)
COLOR UR: YELLOW
CREAT SERPL-MCNC: 0.78 MG/DL (ref 0.5–1.4)
EOSINOPHIL # BLD AUTO: 0.19 K/UL (ref 0–0.51)
EOSINOPHIL NFR BLD: 2.1 % (ref 0–6.9)
EPI CELLS #/AREA URNS HPF: ABNORMAL /HPF
ERYTHROCYTE [DISTWIDTH] IN BLOOD BY AUTOMATED COUNT: 42.9 FL (ref 35.9–50)
GLOBULIN SER CALC-MCNC: 2.6 G/DL (ref 1.9–3.5)
GLUCOSE SERPL-MCNC: 92 MG/DL (ref 65–99)
GLUCOSE UR STRIP.AUTO-MCNC: NEGATIVE MG/DL
HCG UR QL: NEGATIVE
HCT VFR BLD AUTO: 42.4 % (ref 37–47)
HGB BLD-MCNC: 14.1 G/DL (ref 12–16)
IMM GRANULOCYTES # BLD AUTO: 0.05 K/UL (ref 0–0.11)
IMM GRANULOCYTES NFR BLD AUTO: 0.5 % (ref 0–0.9)
KETONES UR STRIP.AUTO-MCNC: NEGATIVE MG/DL
LEUKOCYTE ESTERASE UR QL STRIP.AUTO: NEGATIVE
LYMPHOCYTES # BLD AUTO: 2.98 K/UL (ref 1–4.8)
LYMPHOCYTES NFR BLD: 32.5 % (ref 22–41)
MCH RBC QN AUTO: 31.5 PG (ref 27–33)
MCHC RBC AUTO-ENTMCNC: 33.3 G/DL (ref 33.6–35)
MCV RBC AUTO: 94.9 FL (ref 81.4–97.8)
MICRO URNS: ABNORMAL
MONOCYTES # BLD AUTO: 0.69 K/UL (ref 0–0.85)
MONOCYTES NFR BLD AUTO: 7.5 % (ref 0–13.4)
MUCOUS THREADS #/AREA URNS HPF: ABNORMAL /HPF
NEUTROPHILS # BLD AUTO: 5.25 K/UL (ref 2–7.15)
NEUTROPHILS NFR BLD: 57.2 % (ref 44–72)
NITRITE UR QL STRIP.AUTO: NEGATIVE
NRBC # BLD AUTO: 0 K/UL
NRBC BLD-RTO: 0 /100 WBC
NT-PROBNP SERPL IA-MCNC: 30 PG/ML (ref 0–125)
PH UR STRIP.AUTO: 5.5 [PH] (ref 5–8)
PLATELET # BLD AUTO: 307 K/UL (ref 164–446)
PMV BLD AUTO: 9.1 FL (ref 9–12.9)
POTASSIUM SERPL-SCNC: 4.4 MMOL/L (ref 3.6–5.5)
PROT SERPL-MCNC: 6.6 G/DL (ref 6–8.2)
PROT UR QL STRIP: NEGATIVE MG/DL
RBC # BLD AUTO: 4.47 M/UL (ref 4.2–5.4)
RBC UR QL AUTO: ABNORMAL
S PYO DNA SPEC NAA+PROBE: NOT DETECTED
SODIUM SERPL-SCNC: 141 MMOL/L (ref 135–145)
SP GR UR STRIP.AUTO: 1.02
TSH SERPL DL<=0.005 MIU/L-ACNC: 0.61 UIU/ML (ref 0.38–5.33)
WBC # BLD AUTO: 9.2 K/UL (ref 4.8–10.8)
WBC #/AREA URNS HPF: ABNORMAL /HPF

## 2021-09-01 PROCEDURE — 81025 URINE PREGNANCY TEST: CPT

## 2021-09-01 PROCEDURE — 700111 HCHG RX REV CODE 636 W/ 250 OVERRIDE (IP): Performed by: EMERGENCY MEDICINE

## 2021-09-01 PROCEDURE — 84443 ASSAY THYROID STIM HORMONE: CPT

## 2021-09-01 PROCEDURE — 99283 EMERGENCY DEPT VISIT LOW MDM: CPT

## 2021-09-01 PROCEDURE — 87651 STREP A DNA AMP PROBE: CPT

## 2021-09-01 PROCEDURE — 85025 COMPLETE CBC W/AUTO DIFF WBC: CPT

## 2021-09-01 PROCEDURE — 83880 ASSAY OF NATRIURETIC PEPTIDE: CPT

## 2021-09-01 PROCEDURE — 80053 COMPREHEN METABOLIC PANEL: CPT

## 2021-09-01 PROCEDURE — 81001 URINALYSIS AUTO W/SCOPE: CPT

## 2021-09-01 RX ORDER — DEXAMETHASONE SODIUM PHOSPHATE 10 MG/ML
10 INJECTION, SOLUTION INTRAMUSCULAR; INTRAVENOUS ONCE
Status: COMPLETED | OUTPATIENT
Start: 2021-09-01 | End: 2021-09-01

## 2021-09-01 RX ADMIN — DEXAMETHASONE SODIUM PHOSPHATE 10 MG: 10 INJECTION INTRAMUSCULAR; INTRAVENOUS at 14:39

## 2021-09-01 NOTE — DISCHARGE INSTRUCTIONS
Your labs today were very reassuring.  Your oral exam is also normal.  We have given you a dose of dexamethasone which can help with the swelling in your mouth.  Your swelling around your hands and feet are not obvious clinically, furthermore your labs were all unremarkable including labs to look at your kidney function liver function, your heart pump function, and your thyroid function panel

## 2021-09-01 NOTE — ED PROVIDER NOTES
"ED Provider Note    CHIEF COMPLAINT  Chief Complaint   Patient presents with   • Oral Swelling     Reports \"lump in my throat\" to R side. Noted when swallowing per pt. Speaking in complete sentences, voice does not sound muffled. Resp e/u.    • Other     Reports noting swelling to bilat hands, arms \"and my face\" x approx 1 week.    • Cough     Denies COVID vacc. Reports h/a as well. Denies fevers.        HPI  Eulalia Rendon is a 36 y.o. female who presents with multiple complaints.  Patient reports that she has a mild sore throat.  She reports that she has some pain on the right side of her oropharynx, she says it feels like it right behind her uvula.  She denies any difficulty swallowing but does report the pain is mildly worse with this.  Patient denies any difficulty breathing.  Of note patient also complains of swelling throughout her entire body.  She reports is in her hands and her face and her bilateral feet.  She denies any associated dyspnea lower extremity edema or orthopnea.  Patient denies any associated chest pain or shortness of breath.  She does have a mild cough that comes and goes.  She denies any fevers or chills.  She is not vaccinated for Covid and reports that she does not want a vaccine.    REVIEW OF SYSTEMS  ROS  See HPI for further details. All other systems are negative.     PAST MEDICAL HISTORY   has a past medical history of Anxiety, Arthritis, C. difficile colitis, Colitis, Depression, Ear infection, Esophagitis, Headache(784.0), Infectious disease, Psychiatric disorder, Sinus infection, SVT (supraventricular tachycardia) (HCC), SVT (supraventricular tachycardia) (HCC) (12/21/2012), and Urinary tract infection, site not specified.    SOCIAL HISTORY  Social History     Tobacco Use   • Smoking status: Current Every Day Smoker     Packs/day: 0.50     Years: 15.00     Pack years: 7.50     Types: Cigarettes   • Smokeless tobacco: Never Used   Vaping Use   • Vaping Use: Never used "   Substance and Sexual Activity   • Alcohol use: Yes     Comment: rare   • Drug use: Yes     Comment: edibles   • Sexual activity: Yes     Partners: Male     Comment: none       SURGICAL HISTORY   has a past surgical history that includes gyn surgery; primary c section (2004); repeat c section w tubal ligation (7/15/2013); other cardiac surgery; exploratory laparotomy (12/18/2016); and dilation and evacuation (12/18/2016).    CURRENT MEDICATIONS  Home Medications    **Home medications have not yet been reviewed for this encounter**         ALLERGIES  Allergies   Allergen Reactions   • Clindamycin        PHYSICAL EXAM  Vitals:    09/01/21 1130   BP: 136/84   Pulse: 74   Resp: 18   Temp: 36.2 °C (97.2 °F)   SpO2: 92%       Physical Exam  Constitutional:       Appearance: She is well-developed.   HENT:      Head: Normocephalic and atraumatic.      Mouth/Throat:      Comments: Uvula is mildly enlarged, it is midline, posterior pharynx is otherwise entirely unremarkable, no trismus, no facial swelling, no floor of mouth swelling or submandibular fullness, no stridor. No pharyngeal asymmetry. Nl phonation    Eyes:      Conjunctiva/sclera: Conjunctivae normal.   Cardiovascular:      Rate and Rhythm: Normal rate and regular rhythm.      Comments: No edema appreciated of bilateral upper or lower extremities  Pulmonary:      Effort: Pulmonary effort is normal.      Breath sounds: Normal breath sounds.   Abdominal:      General: Bowel sounds are normal. There is no distension.      Palpations: Abdomen is soft.      Tenderness: There is no abdominal tenderness. There is no rebound.   Musculoskeletal:      Cervical back: Normal range of motion and neck supple.   Skin:     General: Skin is warm and dry.      Findings: No rash.   Neurological:      Mental Status: She is alert and oriented to person, place, and time.   Psychiatric:         Behavior: Behavior normal.       Results for orders placed or performed during the hospital  encounter of 09/01/21   CBC WITH DIFFERENTIAL   Result Value Ref Range    WBC 9.2 4.8 - 10.8 K/uL    RBC 4.47 4.20 - 5.40 M/uL    Hemoglobin 14.1 12.0 - 16.0 g/dL    Hematocrit 42.4 37.0 - 47.0 %    MCV 94.9 81.4 - 97.8 fL    MCH 31.5 27.0 - 33.0 pg    MCHC 33.3 (L) 33.6 - 35.0 g/dL    RDW 42.9 35.9 - 50.0 fL    Platelet Count 307 164 - 446 K/uL    MPV 9.1 9.0 - 12.9 fL    Neutrophils-Polys 57.20 44.00 - 72.00 %    Lymphocytes 32.50 22.00 - 41.00 %    Monocytes 7.50 0.00 - 13.40 %    Eosinophils 2.10 0.00 - 6.90 %    Basophils 0.20 0.00 - 1.80 %    Immature Granulocytes 0.50 0.00 - 0.90 %    Nucleated RBC 0.00 /100 WBC    Neutrophils (Absolute) 5.25 2.00 - 7.15 K/uL    Lymphs (Absolute) 2.98 1.00 - 4.80 K/uL    Monos (Absolute) 0.69 0.00 - 0.85 K/uL    Eos (Absolute) 0.19 0.00 - 0.51 K/uL    Baso (Absolute) 0.02 0.00 - 0.12 K/uL    Immature Granulocytes (abs) 0.05 0.00 - 0.11 K/uL    NRBC (Absolute) 0.00 K/uL   CMP   Result Value Ref Range    Sodium 141 135 - 145 mmol/L    Potassium 4.4 3.6 - 5.5 mmol/L    Chloride 107 96 - 112 mmol/L    Co2 23 20 - 33 mmol/L    Anion Gap 11.0 7.0 - 16.0    Glucose 92 65 - 99 mg/dL    Bun 14 8 - 22 mg/dL    Creatinine 0.78 0.50 - 1.40 mg/dL    Calcium 9.3 8.4 - 10.2 mg/dL    AST(SGOT) 14 12 - 45 U/L    ALT(SGPT) 16 2 - 50 U/L    Alkaline Phosphatase 74 30 - 99 U/L    Total Bilirubin 0.2 0.1 - 1.5 mg/dL    Albumin 4.0 3.2 - 4.9 g/dL    Total Protein 6.6 6.0 - 8.2 g/dL    Globulin 2.6 1.9 - 3.5 g/dL    A-G Ratio 1.5 g/dL   URINALYSIS    Specimen: Urine, Clean Catch   Result Value Ref Range    Color Yellow     Character Clear     Specific Gravity 1.025 <1.035    Ph 5.5 5.0 - 8.0    Glucose Negative Negative mg/dL    Ketones Negative Negative mg/dL    Protein Negative Negative mg/dL    Bilirubin Negative Negative    Nitrite Negative Negative    Leukocyte Esterase Negative Negative    Occult Blood Trace (A) Negative    Micro Urine Req Microscopic    BETA-HCG QUALITATIVE URINE   Result  Value Ref Range    Beta-Hcg Urine Negative Negative   Group A Strep by PCR    Specimen: Throat   Result Value Ref Range    Group A Strep by PCR Not Detected Not Detected   URINE MICROSCOPIC (W/UA)   Result Value Ref Range    WBC 0-2 /hpf    Bacteria Few (A) None /hpf    Epithelial Cells Few Few /hpf    Mucous Threads Moderate /hpf   ESTIMATED GFR   Result Value Ref Range    GFR If African American >60 >60 mL/min/1.73 m 2    GFR If Non African American >60 >60 mL/min/1.73 m 2   TSH WITH REFLEX TO FT4   Result Value Ref Range    TSH 0.607 0.380 - 5.330 uIU/mL   proBrain Natriuretic Peptide, NT   Result Value Ref Range    NT-proBNP 30 0 - 125 pg/mL     No orders to display         COURSE & MEDICAL DECISION MAKING  Pertinent Labs & Imaging studies reviewed. (See chart for details)    Patient here with symptoms consistent likely with viral uvulitis.  I am unable to appreciate any clinical evidence of edema on exam.  I will check basic labs for further risk stratification.  We will also check BNP to evaluate for causes of third spacing.  Finally I will check a TSH.  Patient basic labs are all entirely unremarkable.  A strep test was also checked which is normal.  Patient is very well-appearing.  She does not have any associated cough fevers or myalgias to suggest COVID-19.    Patient offered a COVID-19 vaccine as we now have this available in our emergency department as she is not vaccinated.  She discussed with me that she did not want the vaccine but could not give a reason why.  Therefore I discussed with patient the research behind the vaccine, why I believe it is safe, why I am advocating for it and the risks of not receiving the vaccine.  This irritated patient, and she continued to refuse the vaccine.    Patient given dexamethasone here in the emergency department for uvulitis.     The patient will return for worsening symptoms and is stable at the time of discharge. The patient verbalizes understanding and will  comply.    FINAL IMPRESSION    1. Uvulitis               Electronically signed by: Oneil Roberts M.D., 9/1/2021 11:57 AM

## 2021-09-06 NOTE — ED AVS SNAPSHOT
3/31/2017          Eulalia Rendon  [address]  [place] [address]    Dear Eulalia:    [facility] wants to ensure your discharge home is safe and you or your loved ones have had all your questions answered regarding your care after you leave the hospital.    You may receive a telephone call within two days of your discharge.  This call is to make certain you understand your discharge instructions as well as ensure we provided you with the best care possible during your stay with us.     The call will only last approximately 3-5 minutes and will be done by a nurse.    Once again, we want to ensure your discharge home is safe and that you have a clear understanding of any next steps in your care.  If you have any questions or concerns, please do not hesitate to contact us, we are here for you.  Thank you for choosing [facility] for your healthcare needs.    Sincerely,    Gunnar Qureshi    [facility]         Consult noted for check benefit on Remicade.  I ordered Pharmacy Liaison consult to check coverage.    Earlene Pat RN BSN   Inpatient Care Coordination  [facility]  [phone]

## 2022-03-26 ENCOUNTER — APPOINTMENT (OUTPATIENT)
Dept: RADIOLOGY | Facility: MEDICAL CENTER | Age: 37
End: 2022-03-26
Attending: EMERGENCY MEDICINE
Payer: COMMERCIAL

## 2022-03-26 ENCOUNTER — HOSPITAL ENCOUNTER (EMERGENCY)
Facility: MEDICAL CENTER | Age: 37
End: 2022-03-26
Attending: EMERGENCY MEDICINE
Payer: COMMERCIAL

## 2022-03-26 VITALS
HEART RATE: 73 BPM | WEIGHT: 221.56 LBS | HEIGHT: 63 IN | DIASTOLIC BLOOD PRESSURE: 69 MMHG | OXYGEN SATURATION: 98 % | TEMPERATURE: 98.1 F | SYSTOLIC BLOOD PRESSURE: 124 MMHG | BODY MASS INDEX: 39.26 KG/M2 | RESPIRATION RATE: 18 BRPM

## 2022-03-26 DIAGNOSIS — M54.2 NECK PAIN: ICD-10-CM

## 2022-03-26 DIAGNOSIS — F41.9 ANXIETY: ICD-10-CM

## 2022-03-26 DIAGNOSIS — K13.79 UVULAR EDEMA: ICD-10-CM

## 2022-03-26 DIAGNOSIS — R05.9 COUGH: ICD-10-CM

## 2022-03-26 LAB
ALBUMIN SERPL BCP-MCNC: 4.5 G/DL (ref 3.2–4.9)
ALBUMIN/GLOB SERPL: 1.4 G/DL
ALP SERPL-CCNC: 84 U/L (ref 30–99)
ALT SERPL-CCNC: 30 U/L (ref 2–50)
AMPHET UR QL SCN: NEGATIVE
ANION GAP SERPL CALC-SCNC: 13 MMOL/L (ref 7–16)
AST SERPL-CCNC: 22 U/L (ref 12–45)
BARBITURATES UR QL SCN: NEGATIVE
BASOPHILS # BLD AUTO: 0.3 % (ref 0–1.8)
BASOPHILS # BLD: 0.03 K/UL (ref 0–0.12)
BENZODIAZ UR QL SCN: NEGATIVE
BILIRUB SERPL-MCNC: 0.2 MG/DL (ref 0.1–1.5)
BUN SERPL-MCNC: 17 MG/DL (ref 8–22)
BZE UR QL SCN: NEGATIVE
CALCIUM SERPL-MCNC: 9.4 MG/DL (ref 8.4–10.2)
CANNABINOIDS UR QL SCN: NEGATIVE
CHLORIDE SERPL-SCNC: 102 MMOL/L (ref 96–112)
CO2 SERPL-SCNC: 21 MMOL/L (ref 20–33)
CREAT SERPL-MCNC: 0.92 MG/DL (ref 0.5–1.4)
EOSINOPHIL # BLD AUTO: 0.22 K/UL (ref 0–0.51)
EOSINOPHIL NFR BLD: 2.1 % (ref 0–6.9)
ERYTHROCYTE [DISTWIDTH] IN BLOOD BY AUTOMATED COUNT: 42.5 FL (ref 35.9–50)
GFR SERPLBLD CREATININE-BSD FMLA CKD-EPI: 82 ML/MIN/1.73 M 2
GLOBULIN SER CALC-MCNC: 3.2 G/DL (ref 1.9–3.5)
GLUCOSE SERPL-MCNC: 93 MG/DL (ref 65–99)
HCG SERPL QL: NEGATIVE
HCT VFR BLD AUTO: 45.3 % (ref 37–47)
HGB BLD-MCNC: 15 G/DL (ref 12–16)
IMM GRANULOCYTES # BLD AUTO: 0.08 K/UL (ref 0–0.11)
IMM GRANULOCYTES NFR BLD AUTO: 0.8 % (ref 0–0.9)
LYMPHOCYTES # BLD AUTO: 3.33 K/UL (ref 1–4.8)
LYMPHOCYTES NFR BLD: 31.8 % (ref 22–41)
MCH RBC QN AUTO: 30.9 PG (ref 27–33)
MCHC RBC AUTO-ENTMCNC: 33.1 G/DL (ref 33.6–35)
MCV RBC AUTO: 93.2 FL (ref 81.4–97.8)
METHADONE UR QL SCN: NEGATIVE
MONOCYTES # BLD AUTO: 0.67 K/UL (ref 0–0.85)
MONOCYTES NFR BLD AUTO: 6.4 % (ref 0–13.4)
NEUTROPHILS # BLD AUTO: 6.13 K/UL (ref 2–7.15)
NEUTROPHILS NFR BLD: 58.6 % (ref 44–72)
NRBC # BLD AUTO: 0 K/UL
NRBC BLD-RTO: 0 /100 WBC
OPIATES UR QL SCN: NEGATIVE
OXYCODONE UR QL SCN: NEGATIVE
PCP UR QL SCN: NEGATIVE
PLATELET # BLD AUTO: 317 K/UL (ref 164–446)
PMV BLD AUTO: 9.1 FL (ref 9–12.9)
POTASSIUM SERPL-SCNC: 4.1 MMOL/L (ref 3.6–5.5)
PROPOXYPH UR QL SCN: NEGATIVE
PROT SERPL-MCNC: 7.7 G/DL (ref 6–8.2)
RBC # BLD AUTO: 4.86 M/UL (ref 4.2–5.4)
SODIUM SERPL-SCNC: 136 MMOL/L (ref 135–145)
TSH SERPL DL<=0.005 MIU/L-ACNC: 1 UIU/ML (ref 0.38–5.33)
WBC # BLD AUTO: 10.5 K/UL (ref 4.8–10.8)

## 2022-03-26 PROCEDURE — 85025 COMPLETE CBC W/AUTO DIFF WBC: CPT

## 2022-03-26 PROCEDURE — 36415 COLL VENOUS BLD VENIPUNCTURE: CPT

## 2022-03-26 PROCEDURE — 84443 ASSAY THYROID STIM HORMONE: CPT

## 2022-03-26 PROCEDURE — 80053 COMPREHEN METABOLIC PANEL: CPT

## 2022-03-26 PROCEDURE — 84703 CHORIONIC GONADOTROPIN ASSAY: CPT

## 2022-03-26 PROCEDURE — 80307 DRUG TEST PRSMV CHEM ANLYZR: CPT

## 2022-03-26 PROCEDURE — 71045 X-RAY EXAM CHEST 1 VIEW: CPT

## 2022-03-26 PROCEDURE — 96374 THER/PROPH/DIAG INJ IV PUSH: CPT

## 2022-03-26 PROCEDURE — 700111 HCHG RX REV CODE 636 W/ 250 OVERRIDE (IP): Performed by: EMERGENCY MEDICINE

## 2022-03-26 PROCEDURE — 99284 EMERGENCY DEPT VISIT MOD MDM: CPT

## 2022-03-26 RX ORDER — HYDROXYZINE HYDROCHLORIDE 25 MG/1
25-50 TABLET, FILM COATED ORAL 3 TIMES DAILY PRN
Qty: 20 TABLET | Refills: 0 | Status: SHIPPED | OUTPATIENT
Start: 2022-03-26 | End: 2022-07-25

## 2022-03-26 RX ORDER — METHYLPREDNISOLONE SODIUM SUCCINATE 40 MG/ML
40 INJECTION, POWDER, LYOPHILIZED, FOR SOLUTION INTRAMUSCULAR; INTRAVENOUS ONCE
Status: COMPLETED | OUTPATIENT
Start: 2022-03-26 | End: 2022-03-26

## 2022-03-26 RX ADMIN — METHYLPREDNISOLONE SODIUM SUCCINATE 40 MG: 40 INJECTION, POWDER, FOR SOLUTION INTRAMUSCULAR; INTRAVENOUS at 17:08

## 2022-03-26 ASSESSMENT — FIBROSIS 4 INDEX: FIB4 SCORE: 0.41

## 2022-03-26 NOTE — ED PROVIDER NOTES
"ED Provider Note    Scribed for Liam Pappas M.D. by Gillian Brown. 3/26/2022  4:39 PM    Primary care provider: Pcp Pt States None  Means of arrival: Walk   History obtained from: Patient  History limited by: None    CHIEF COMPLAINT  Chief Complaint   Patient presents with    Anxiety     \"I have just been shaky for the last couple of weeks\" pt became tearful discussing feeling     Neck Pain     Over the last few weeks, just has been getting worse        HPI  Eulalia Rendon is a 36 y.o. female who presents to the Emergency Department for worsening neck pain. Per the patient, she has been coughing up phlegm daily for the past month and has had pain along both sides of her neck with deep breaths. Concurrently, she has felt the sensation of something stuck in her throat that she describes as feeling as if her tonsils are swollen. Initially her neck pain was only present with deep breaths, but then today it became more constant and intense, prompting her to come to the ED for further evaluation. The patient reports additional symptoms of shakiness onset today and intermittent tingling and swelling around her fingers with activity, and denies difficulty swallowing or breathing. No exacerbating or alleviating factors were noted. She has not been evaluated for any of these symptoms previously.     REVIEW OF SYSTEMS  Pertinent positives include neck pain, throat swelling, productive cough, shakiness, tingling. Pertinent negatives include no difficulty breathing or swallowing.  All other systems reviewed and negative.      PAST MEDICAL HISTORY   has a past medical history of Anxiety, Arthritis, C. difficile colitis, Colitis, Depression, Ear infection, Esophagitis, Headache(784.0), Infectious disease, Psychiatric disorder, Sinus infection, SVT (supraventricular tachycardia) (HCC), SVT (supraventricular tachycardia) (HCC) (12/21/2012), and Urinary tract infection, site not specified.    SURGICAL HISTORY   has a " "past surgical history that includes gyn surgery; primary c section (2004); repeat c section w tubal ligation (7/15/2013); other cardiac surgery; exploratory laparotomy (12/18/2016); and dilation and evacuation (12/18/2016).    SOCIAL HISTORY  Social History     Tobacco Use    Smoking status: Current Every Day Smoker     Packs/day: 0.50     Years: 15.00     Pack years: 7.50     Types: Cigarettes    Smokeless tobacco: Never Used   Vaping Use    Vaping Use: Never used   Substance Use Topics    Alcohol use: Yes     Comment: rare    Drug use: Yes     Comment: edibles      Social History     Substance and Sexual Activity   Drug Use Yes    Comment: edibles       FAMILY HISTORY  Family History   Problem Relation Age of Onset    Thyroid Mother     Other Mother         svt    Hyperlipidemia Mother     Hyperlipidemia Father     Hypertension Father     Diabetes Father         pills    Alcohol/Drug Father     Thyroid Sister     Other Brother         heart murmur    Stroke Paternal Grandfather     Heart Attack Paternal Grandfather     Cancer Paternal Grandfather         prostate       CURRENT MEDICATIONS  Home Medications       Reviewed by Cee Lambert R.N. (Registered Nurse) on 03/26/22 at 1545  Med List Status: <None>     Medication Last Dose Status        Patient Braulio Taking any Medications                           ALLERGIES  Allergies   Allergen Reactions    Clindamycin        PHYSICAL EXAM  VITAL SIGNS: /95   Pulse (!) 132   Temp 36.4 °C (97.6 °F) (Temporal)   Resp 19   Ht 1.6 m (5' 3\")   Wt 101 kg (221 lb 9 oz)   SpO2 95%   BMI 39.25 kg/m²     Constitutional: Well developed, Well nourished, Moderate to severe distress, Non-toxic appearance, Tearful.    HENT: Normocephalic, Atraumatic, Bilateral external ears normal, Oropharynx moist with slight uvular edema, No oral exudates.   Eyes: PERRLA, EOMI, Conjunctiva normal, No discharge.   Neck: No tenderness, Supple, No stridor.   Lymphatic: No " lymphadenopathy noted.   Cardiovascular: Tachycardic heart rate, Normal rhythm.   Thorax & Lungs: Hyperventilating, Clear to auscultation bilaterally, No respiratory distress, No wheezing, No crackles.   Abdomen: Soft, No tenderness, No masses, No pulsatile masses.   Skin: Warm, Dry, No erythema, No rash.   Extremities:, No edema No cyanosis.   Musculoskeletal: No tenderness to palpation or major deformities noted.  Intact distal pulses  Neurologic: Awake, alert. Tremulous, Moves all extremities spontaneously.  Psychiatric: Tearful.     LABS  Results for orders placed or performed during the hospital encounter of 03/26/22   CBC WITH DIFFERENTIAL   Result Value Ref Range    WBC 10.5 4.8 - 10.8 K/uL    RBC 4.86 4.20 - 5.40 M/uL    Hemoglobin 15.0 12.0 - 16.0 g/dL    Hematocrit 45.3 37.0 - 47.0 %    MCV 93.2 81.4 - 97.8 fL    MCH 30.9 27.0 - 33.0 pg    MCHC 33.1 (L) 33.6 - 35.0 g/dL    RDW 42.5 35.9 - 50.0 fL    Platelet Count 317 164 - 446 K/uL    MPV 9.1 9.0 - 12.9 fL    Neutrophils-Polys 58.60 44.00 - 72.00 %    Lymphocytes 31.80 22.00 - 41.00 %    Monocytes 6.40 0.00 - 13.40 %    Eosinophils 2.10 0.00 - 6.90 %    Basophils 0.30 0.00 - 1.80 %    Immature Granulocytes 0.80 0.00 - 0.90 %    Nucleated RBC 0.00 /100 WBC    Neutrophils (Absolute) 6.13 2.00 - 7.15 K/uL    Lymphs (Absolute) 3.33 1.00 - 4.80 K/uL    Monos (Absolute) 0.67 0.00 - 0.85 K/uL    Eos (Absolute) 0.22 0.00 - 0.51 K/uL    Baso (Absolute) 0.03 0.00 - 0.12 K/uL    Immature Granulocytes (abs) 0.08 0.00 - 0.11 K/uL    NRBC (Absolute) 0.00 K/uL   COMP METABOLIC PANEL   Result Value Ref Range    Sodium 136 135 - 145 mmol/L    Potassium 4.1 3.6 - 5.5 mmol/L    Chloride 102 96 - 112 mmol/L    Co2 21 20 - 33 mmol/L    Anion Gap 13.0 7.0 - 16.0    Glucose 93 65 - 99 mg/dL    Bun 17 8 - 22 mg/dL    Creatinine 0.92 0.50 - 1.40 mg/dL    Calcium 9.4 8.4 - 10.2 mg/dL    AST(SGOT) 22 12 - 45 U/L    ALT(SGPT) 30 2 - 50 U/L    Alkaline Phosphatase 84 30 - 99 U/L     Total Bilirubin 0.2 0.1 - 1.5 mg/dL    Albumin 4.5 3.2 - 4.9 g/dL    Total Protein 7.7 6.0 - 8.2 g/dL    Globulin 3.2 1.9 - 3.5 g/dL    A-G Ratio 1.4 g/dL   HCG QUAL SERUM   Result Value Ref Range    Beta-Hcg Qualitative Serum Negative Negative   TSH   Result Value Ref Range    TSH 1.000 0.380 - 5.330 uIU/mL   ESTIMATED GFR   Result Value Ref Range    GFR (CKD-EPI) 82 >60 mL/min/1.73 m 2         RADIOLOGY  DX-CHEST-PORTABLE (1 VIEW)   Final Result      No radiographic evidence of acute cardiopulmonary process.        The radiologist's interpretation of all radiological studies have been reviewed by me.    COURSE & MEDICAL DECISION MAKING  Nursing notes, VSPATx reviewed in chart.    4:39 PM - Patient seen and examined at bedside. Patient will be treated with Solu-medrol 40 mg. Ordered DX-chest, TSH with reflex to FT4, urine drug screen, CBC w/diff, CMP, and HCG qual serum to evaluate her symptoms. Differential diagnoses include but not limited to: metabolic abnormalities, ACS, viral.     Decision Making:  patient with multiple complaints, including neck pain, secondary to coughing, uvular edema, fullness in the neck, she has diffuse tremors, trouble with her memory.  Work appears unremarkable including thyroid, I believe the patient has some uvular edema give the patient some steroids.  Patient likely has some seasonal allergies possible URI.  Chest x-ray is negative, reassured the patient, I will give the patient a prescription for hydroxyzine for her anxiety, have her follow-up with her primary care physician, have the patient return with worsening symptoms.    The patient will return for new or worsening symptoms and is stable at the time of discharge.    The patient is referred to a primary physician for blood pressure management, diabetic screening, and for all other preventative health concerns.    DISPOSITION:  Patient will be discharged home in stable condition.    FOLLOW UP:  [facility]  Center, Emergency Dept  [address]  [place] [address]  [phone]    If symptoms worsen    OUTPATIENT MEDICATIONS:  New Prescriptions    HYDROXYZINE HCL (ATARAX) 25 MG TAB    Take 1-2 Tablets by mouth 3 times a day as needed for Anxiety.           FINAL IMPRESSION  1. Neck pain    2. Cough    3. Uvular edema    4. Anxiety          Gillian VILLALOBOS (Scribe), am scribing for, and in the presence of, Liam Pappas M.D..    Electronically signed by: Gillian Brown (Scribe), 3/26/2022    ILiam M.D. personally performed the services described in this documentation, as scribed by Gillian Brown in my presence, and it is both accurate and complete.    C    The note accurately reflects work and decisions made by me.  Liam Pappas M.D.  3/26/2022  6:12 PM

## 2022-03-26 NOTE — ED NOTES
"Pt ambulated to RTA 13.  Pt tearful, began listing multiple concerns.    Pt c/o \"throat feels super swollen\" x couple of weeks, no difficulty breathing or swallowing noted.  Pt c/o \"memory has been really bad\" for a couple of months.  Pt c/o fingers swell w/ activity including \"walk around the [place].\"  Pt has not followed up w/ PCP prior to today.    "

## 2022-03-26 NOTE — ED TRIAGE NOTES
"Chief Complaint   Patient presents with   • Anxiety     \"I have just been shaky for the last couple of weeks\" pt became tearful discussing feeling    • Neck Pain     Over the last few weeks, just has been getting worse      /95   Pulse (!) 132   Temp 36.4 °C (97.6 °F) (Temporal)   Resp 19   Ht 1.6 m (5' 3\")   Wt 101 kg (221 lb 9 oz)   SpO2 95%   BMI 39.25 kg/m²       "

## 2022-03-27 NOTE — ED NOTES
PIV removed. Pt provided with discharge paper work and follow up care provided. Pt declines questions. Pt to ambulate out of ER.

## 2022-03-27 NOTE — ED NOTES
Chest xray complete, Pt up to restroom to provide urine sample at this time. ER tech to obtain EKG when pt returns to room.

## 2022-07-25 ENCOUNTER — HOSPITAL ENCOUNTER (EMERGENCY)
Facility: MEDICAL CENTER | Age: 37
End: 2022-07-25
Attending: EMERGENCY MEDICINE
Payer: COMMERCIAL

## 2022-07-25 ENCOUNTER — APPOINTMENT (OUTPATIENT)
Dept: RADIOLOGY | Facility: MEDICAL CENTER | Age: 37
End: 2022-07-25
Attending: EMERGENCY MEDICINE
Payer: COMMERCIAL

## 2022-07-25 VITALS
WEIGHT: 224.21 LBS | DIASTOLIC BLOOD PRESSURE: 78 MMHG | RESPIRATION RATE: 18 BRPM | HEIGHT: 63 IN | HEART RATE: 79 BPM | TEMPERATURE: 98.5 F | OXYGEN SATURATION: 97 % | SYSTOLIC BLOOD PRESSURE: 132 MMHG | BODY MASS INDEX: 39.73 KG/M2

## 2022-07-25 DIAGNOSIS — R07.89 CHEST WALL PAIN: ICD-10-CM

## 2022-07-25 LAB
ANION GAP SERPL CALC-SCNC: 10 MMOL/L (ref 7–16)
APPEARANCE UR: CLEAR
BASOPHILS # BLD AUTO: 0.3 % (ref 0–1.8)
BASOPHILS # BLD: 0.03 K/UL (ref 0–0.12)
BILIRUB UR QL STRIP.AUTO: NEGATIVE
BUN SERPL-MCNC: 13 MG/DL (ref 8–22)
CALCIUM SERPL-MCNC: 9.3 MG/DL (ref 8.4–10.2)
CHLORIDE SERPL-SCNC: 102 MMOL/L (ref 96–112)
CO2 SERPL-SCNC: 24 MMOL/L (ref 20–33)
COLOR UR: YELLOW
CREAT SERPL-MCNC: 0.82 MG/DL (ref 0.5–1.4)
D DIMER PPP IA.FEU-MCNC: <0.27 UG/ML (FEU) (ref 0–0.5)
EOSINOPHIL # BLD AUTO: 0.21 K/UL (ref 0–0.51)
EOSINOPHIL NFR BLD: 2.4 % (ref 0–6.9)
ERYTHROCYTE [DISTWIDTH] IN BLOOD BY AUTOMATED COUNT: 41.1 FL (ref 35.9–50)
GFR SERPLBLD CREATININE-BSD FMLA CKD-EPI: 94 ML/MIN/1.73 M 2
GLUCOSE SERPL-MCNC: 108 MG/DL (ref 65–99)
GLUCOSE UR STRIP.AUTO-MCNC: NEGATIVE MG/DL
HCG UR QL: NEGATIVE
HCT VFR BLD AUTO: 43.9 % (ref 37–47)
HGB BLD-MCNC: 14.5 G/DL (ref 12–16)
IMM GRANULOCYTES # BLD AUTO: 0.06 K/UL (ref 0–0.11)
IMM GRANULOCYTES NFR BLD AUTO: 0.7 % (ref 0–0.9)
KETONES UR STRIP.AUTO-MCNC: NEGATIVE MG/DL
LEUKOCYTE ESTERASE UR QL STRIP.AUTO: NEGATIVE
LYMPHOCYTES # BLD AUTO: 2.81 K/UL (ref 1–4.8)
LYMPHOCYTES NFR BLD: 31.8 % (ref 22–41)
MCH RBC QN AUTO: 30.4 PG (ref 27–33)
MCHC RBC AUTO-ENTMCNC: 33 G/DL (ref 33.6–35)
MCV RBC AUTO: 92 FL (ref 81.4–97.8)
MICRO URNS: NORMAL
MONOCYTES # BLD AUTO: 0.65 K/UL (ref 0–0.85)
MONOCYTES NFR BLD AUTO: 7.3 % (ref 0–13.4)
NEUTROPHILS # BLD AUTO: 5.09 K/UL (ref 2–7.15)
NEUTROPHILS NFR BLD: 57.5 % (ref 44–72)
NITRITE UR QL STRIP.AUTO: NEGATIVE
NRBC # BLD AUTO: 0 K/UL
NRBC BLD-RTO: 0 /100 WBC
PH UR STRIP.AUTO: 5.5 [PH] (ref 5–8)
PLATELET # BLD AUTO: 288 K/UL (ref 164–446)
PMV BLD AUTO: 9.2 FL (ref 9–12.9)
POTASSIUM SERPL-SCNC: 4.2 MMOL/L (ref 3.6–5.5)
PROT UR QL STRIP: NEGATIVE MG/DL
RBC # BLD AUTO: 4.77 M/UL (ref 4.2–5.4)
RBC UR QL AUTO: NEGATIVE
SODIUM SERPL-SCNC: 136 MMOL/L (ref 135–145)
SP GR UR STRIP.AUTO: 1.02
WBC # BLD AUTO: 8.9 K/UL (ref 4.8–10.8)

## 2022-07-25 PROCEDURE — 36415 COLL VENOUS BLD VENIPUNCTURE: CPT

## 2022-07-25 PROCEDURE — 700111 HCHG RX REV CODE 636 W/ 250 OVERRIDE (IP): Performed by: EMERGENCY MEDICINE

## 2022-07-25 PROCEDURE — 71045 X-RAY EXAM CHEST 1 VIEW: CPT

## 2022-07-25 PROCEDURE — 81003 URINALYSIS AUTO W/O SCOPE: CPT

## 2022-07-25 PROCEDURE — 96374 THER/PROPH/DIAG INJ IV PUSH: CPT

## 2022-07-25 PROCEDURE — 80048 BASIC METABOLIC PNL TOTAL CA: CPT

## 2022-07-25 PROCEDURE — 81025 URINE PREGNANCY TEST: CPT

## 2022-07-25 PROCEDURE — 85025 COMPLETE CBC W/AUTO DIFF WBC: CPT

## 2022-07-25 PROCEDURE — 99284 EMERGENCY DEPT VISIT MOD MDM: CPT

## 2022-07-25 PROCEDURE — 85379 FIBRIN DEGRADATION QUANT: CPT

## 2022-07-25 RX ORDER — ORPHENADRINE CITRATE 100 MG/1
100 TABLET, EXTENDED RELEASE ORAL 2 TIMES DAILY
Qty: 14 TABLET | Refills: 0 | Status: SHIPPED | OUTPATIENT
Start: 2022-07-25 | End: 2022-08-01

## 2022-07-25 RX ORDER — KETOROLAC TROMETHAMINE 30 MG/ML
30 INJECTION, SOLUTION INTRAMUSCULAR; INTRAVENOUS ONCE
Status: DISCONTINUED | OUTPATIENT
Start: 2022-07-25 | End: 2022-07-25

## 2022-07-25 RX ORDER — KETOROLAC TROMETHAMINE 30 MG/ML
15 INJECTION, SOLUTION INTRAMUSCULAR; INTRAVENOUS ONCE
Status: COMPLETED | OUTPATIENT
Start: 2022-07-25 | End: 2022-07-25

## 2022-07-25 RX ORDER — ESCITALOPRAM OXALATE 20 MG/1
20 TABLET ORAL DAILY
COMMUNITY

## 2022-07-25 RX ADMIN — KETOROLAC TROMETHAMINE 15 MG: 30 INJECTION, SOLUTION INTRAMUSCULAR; INTRAVENOUS at 10:26

## 2022-07-25 ASSESSMENT — FIBROSIS 4 INDEX: FIB4 SCORE: 0.47

## 2022-07-25 ASSESSMENT — PAIN DESCRIPTION - PAIN TYPE: TYPE: ACUTE PAIN

## 2022-07-25 NOTE — ED NOTES
PIV established, blood work sent to lab  Safety precautions in place including gurney locked in lowest position, call light within reach. Pt educated to use call light if requiring any assistance with getting oob.

## 2022-07-25 NOTE — ED PROVIDER NOTES
"ED Provider Note    Scribed for Dalton Yang M.D. by Dalton Yang M.D.. 7/25/2022,  9:31 AM.    CHIEF COMPLAINT  Chief Complaint   Patient presents with   • Flank Pain     Left, started yesterday   • Dysuria     \"The first time it did\", \"the second time it didn't\"   • Back Pain     \"The whole left side hurts when I take a deep breath,\" started yesterday morning.       HPI  Eulalia Rendon is a 37 y.o. female who presents to the Emergency Department reporting that the whole left side of her thorax hurts.  She said that it started pretty much at random, with no falls or trauma.  She says that she \"just play video games all day, \"so it is hard to think of any injury.  She said that she did have some dysuria, with right lower quadrant abdominal pain, but that was only once, and she is urinated subsequently without any flank pain.  She denies fevers or chills, nausea or vomiting, shortness of breath, or cough beyond her baseline smoker's cough.  She does not have a personal history of DVT or PE.    REVIEW OF SYSTEMS  See HPI for further details. All other systems are negative.     PAST MEDICAL HISTORY   has a past medical history of Anxiety, Arthritis, C. difficile colitis, Colitis, Depression, Ear infection, Esophagitis, Headache(784.0), Infectious disease, Psychiatric disorder, Sinus infection, SVT (supraventricular tachycardia) (HCC), SVT (supraventricular tachycardia) (HCC) (12/21/2012), and Urinary tract infection, site not specified.    SOCIAL HISTORY  Social History     Tobacco Use   • Smoking status: Current Every Day Smoker     Packs/day: 0.50   • Smokeless tobacco: Never Used   Vaping Use   • Vaping Use: Never used   Substance and Sexual Activity   • Alcohol use: Not Currently     Comment: denies   • Drug use: Yes     Comment: edibles   • Sexual activity: Yes     Partners: Male     Comment: none     Social History     Substance and Sexual Activity   Drug Use Yes    Comment: edibles " "      SURGICAL HISTORY   has a past surgical history that includes gyn surgery; primary c section (2004); repeat c section w tubal ligation (7/15/2013); other cardiac surgery; exploratory laparotomy (12/18/2016); and dilation and evacuation (12/18/2016).    CURRENT MEDICATIONS  Home Medications     Reviewed by Jenniffer Sawyer (Pharmacy Tech) on 07/25/22 at 0959  Med List Status: Complete   Medication Last Dose Status   Chlorpheniramine Maleate (ALLERGY PO) 7/24/2022 Active   Cholecalciferol (VITAMIN D3 PO) 7/24/2022 Active   Cyanocobalamin (VITAMIN B-12 PO) 7/24/2022 Active   diphenhydrAMINE HCl (BENADRYL PO) 7/24/2022 Active   escitalopram (LEXAPRO) 20 MG tablet 7/24/2022 Active                ALLERGIES  Allergies   Allergen Reactions   • Clindamycin Hives       PHYSICAL EXAM  VITAL SIGNS: /78   Pulse 79   Temp 36.9 °C (98.5 °F) (Temporal)   Resp 18   Ht 1.6 m (5' 3\")   Wt 102 kg (224 lb 3.3 oz)   LMP 06/25/2022 (Approximate)   SpO2 97%   BMI 39.72 kg/m²   Pulse ox interpretation: I interpret this pulse ox as normal.  Constitutional: Alert in no apparent distress.  HENT: No signs of trauma, Bilateral external ears normal, Nose normal.   Eyes: Conjunctiva normal, Non-icteric.   Neck: Normal range of motion, Supple, No stridor.   Lymphatic: No lymphadenopathy noted.   Cardiovascular: Regular rate and rhythm, no murmurs.   Thorax & Lungs: Normal breath sounds, No respiratory distress, No wheezing, No chest tenderness.   Abdomen: Bowel sounds normal, Soft, No tenderness, No masses, No pulsatile masses. No peritoneal signs.  Skin: Warm, Dry, No erythema, No rash.   Back: No midline bony tenderness.  There is apparent tenderness to the thoracic paraspinous muscles (but the exam is difficult to interpret, even for the patient)  Extremities: Intact distal pulses, No edema, No cyanosis.  Musculoskeletal: Good range of motion in all major joints. No or major deformities noted.   Neurologic: Alert , Normal " motor function, Normal sensory function, No focal deficits noted.   Psychiatric: Affect normal, Judgment normal, Mood normal.     DIAGNOSTIC STUDIES / PROCEDURES      LABS  Labs Reviewed   CBC WITH DIFFERENTIAL - Abnormal; Notable for the following components:       Result Value    MCHC 33.0 (*)     All other components within normal limits   BASIC METABOLIC PANEL - Abnormal; Notable for the following components:    Glucose 108 (*)     All other components within normal limits   URINALYSIS   HCG QUALITATIVE UR   D-DIMER   ESTIMATED GFR     All labs reviewed by me.    RADIOLOGY  DX-CHEST-PORTABLE (1 VIEW)   Final Result      No radiographic evidence of acute cardiopulmonary process.        The radiologist's interpretation of all radiological studies have been reviewed by me.    COURSE & MEDICAL DECISION MAKING  Nursing notes, VS, PMSFHx reviewed in chart.     9:31 AM Patient seen and examined at bedside. Differential diagnosis includes but is not limited to musculoskeletal chest pain with reproducible thoracic muscle tenderness, though the patient herself seems somewhat unsure whether it is tenderness, or whether pressure is causing pain deeper in.  She is quite sedentary, and is a smoker, so there is a significant pretest risk for PE.  Respiratory infection seems less likely.  Equivocal report of dysuria as well.. Ordered for chest x-ray, CBC, BMP, D-dimer, urinalysis, urine pregnancy to evaluate. Patient will be treated with Toradol for her symptoms.     10:46 AM D-dimer is negative.    This patient's comprehensive battery of test was unremarkable.  There is no evidence of respiratory urinary tract infection, significant blood work abnormalities, including a normal D-dimer.  With the reproducible tenderness of the left thoracic paraspinous muscles, this seems like musculoskeletal pain.  The patient will be treated at home with Norflex, and will also take 600 mg of ibuprofen every 6 hours with food or milk.  We  discussed that heat, and gentle activity can be helpful as well.     The patient will return for new or worsening symptoms and is stable at the time of discharge.    The patient is referred to a primary physician for blood pressure management, diabetic screening, and for all other preventative health concerns.    DISPOSITION:  Patient will be discharged home in stable condition.    FOLLOW UP:  [facility]  [address]  [facility] [address]        [facility] ([facility] - Primary Care and Family Medicine  [address] [facility] [place] [address] [phone]        [facility] - Behavioral Health Counseling  [address] [facility] [address]  [phone]          OUTPATIENT MEDICATIONS:  Discharge Medication List as of 7/25/2022 11:13 AM      START taking these medications    Details   orphenadrine (NORFLEX) 100 MG tablet Take 1 Tablet by mouth 2 times a day for 7 days., Disp-14 Tablet, R-0, Normal               FINAL IMPRESSION  1. Chest wall pain

## 2022-07-25 NOTE — ED TRIAGE NOTES
"Chief Complaint   Patient presents with   • Flank Pain     Left, started yesterday   • Dysuria     \"The first time it did\", \"the second time it didn't\"   • Back Pain     \"The whole left side hurts when I take a deep breath,\" started yesterday morning.     BP (!) 135/94   Pulse (!) 103   Temp 36.9 °C (98.4 °F) (Temporal)   Resp 18   Ht 1.6 m (5' 3\")   Wt 102 kg (224 lb 3.3 oz)   LMP 06/25/2022 (Approximate)   SpO2 96%   BMI 39.72 kg/m²     Pt ambulated to ED by self for LUQ pain radiating to Left Flank started yesterday w/ dysuria.  Pt also c/o Left side back pain w/ taking a deep breath.    "

## 2022-07-25 NOTE — DISCHARGE INSTRUCTIONS
Your tests here were reassuring.  Your x-ray was normal, without evidence of infection, all of your lab work was reassuring, without evidence of infection in your blood or urine, or any chance of a clot in your lungs.  Your pain is most likely from the muscles and nerves in your chest wall.  Take 600 mg of ibuprofen every 6 hours with food or milk, and the muscle relaxant we have prescribed, heat and gentle activity can also be helpful.

## 2022-08-04 ENCOUNTER — NON-PROVIDER VISIT (OUTPATIENT)
Dept: URGENT CARE | Facility: PHYSICIAN GROUP | Age: 37
End: 2022-08-04

## 2022-08-04 DIAGNOSIS — Z02.1 PRE-EMPLOYMENT DRUG SCREENING: ICD-10-CM

## 2022-08-04 PROCEDURE — 80305 DRUG TEST PRSMV DIR OPT OBS: CPT

## 2022-08-26 NOTE — PATIENT INSTRUCTIONS
Please consider following these simple instructions to improve your dietary choices.  1.  Eat whole foods (fruits/vegetables from the produce section without processing)  2.  Eat foods of different colors  3.  Don't buy processed/packaged food with more than 5 ingredients on the label, or multiple ingredients you don't recognize  4.  Minimize sugar.  This means any product which has added sugar in the label (soda, candy, and many processed foods).      Please start exercising 30 minutes 4-5 times/week and increase intensity as tolerated.  Your smoking quit date is December 21st, 2017.       [Fever] : no fever [Fatigue] : no fatigue [Recent Change In Weight] : ~T no recent weight change [Dysphagia] : no dysphagia [Odynophagia] : no odynophagia [Chest Pain] : no chest pain [Shortness Of Breath] : no shortness of breath [Abdominal Pain] : no abdominal pain [Joint Pain] : no joint pain [Joint Stiffness] : no joint stiffness [Skin Rash] : no skin rash [Anxiety] : no anxiety [Depression] : no depression [Easy Bleeding] : no tendency for easy bleeding [Easy Bruising] : no tendency for easy bruising [Swollen Glands] : no swollen glands

## 2022-09-08 ENCOUNTER — OFFICE VISIT (OUTPATIENT)
Dept: URGENT CARE | Facility: PHYSICIAN GROUP | Age: 37
End: 2022-09-08
Payer: COMMERCIAL

## 2022-09-08 VITALS
RESPIRATION RATE: 20 BRPM | OXYGEN SATURATION: 93 % | BODY MASS INDEX: 37.84 KG/M2 | TEMPERATURE: 100.6 F | SYSTOLIC BLOOD PRESSURE: 140 MMHG | DIASTOLIC BLOOD PRESSURE: 84 MMHG | WEIGHT: 213.6 LBS | HEART RATE: 100 BPM

## 2022-09-08 DIAGNOSIS — Z72.0 TOBACCO USER: ICD-10-CM

## 2022-09-08 DIAGNOSIS — R50.9 FEVER, UNSPECIFIED: ICD-10-CM

## 2022-09-08 DIAGNOSIS — J01.90 ACUTE SINUSITIS, RECURRENCE NOT SPECIFIED, UNSPECIFIED LOCATION: ICD-10-CM

## 2022-09-08 DIAGNOSIS — R05.9 COUGH: ICD-10-CM

## 2022-09-08 LAB
EXTERNAL QUALITY CONTROL: NORMAL
INT CON NEG: NORMAL
INT CON POS: NORMAL
SARS-COV+SARS-COV-2 AG RESP QL IA.RAPID: NEGATIVE

## 2022-09-08 PROCEDURE — 87426 SARSCOV CORONAVIRUS AG IA: CPT | Performed by: NURSE PRACTITIONER

## 2022-09-08 PROCEDURE — 99214 OFFICE O/P EST MOD 30 MIN: CPT | Performed by: NURSE PRACTITIONER

## 2022-09-08 RX ORDER — FLUTICASONE PROPIONATE 50 MCG
2 SPRAY, SUSPENSION (ML) NASAL DAILY
Qty: 16 G | Refills: 0 | Status: SHIPPED | OUTPATIENT
Start: 2022-09-08 | End: 2022-09-22

## 2022-09-08 RX ORDER — AMOXICILLIN AND CLAVULANATE POTASSIUM 875; 125 MG/1; MG/1
1 TABLET, FILM COATED ORAL 2 TIMES DAILY
Qty: 20 TABLET | Refills: 0 | Status: SHIPPED | OUTPATIENT
Start: 2022-09-08 | End: 2022-09-18

## 2022-09-08 RX ORDER — BENZONATATE 100 MG/1
CAPSULE ORAL
Qty: 60 CAPSULE | Refills: 0 | Status: SHIPPED | OUTPATIENT
Start: 2022-09-08

## 2022-09-08 ASSESSMENT — FIBROSIS 4 INDEX: FIB4 SCORE: 0.52

## 2022-09-08 NOTE — LETTER
September 8, 2022       Patient: Eulalia Rendon   YOB: 1985   Date of Visit: 9/8/2022         To Whom It May Concern:    In my medical opinion, I recommend that Eulalia Rendon return to full duty, no restrictions 09/11/22              Sincerely,          ABRIL JohnsonPWILLIAM.  Electronically Signed
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
## 2022-09-22 ENCOUNTER — OFFICE VISIT (OUTPATIENT)
Dept: URGENT CARE | Facility: PHYSICIAN GROUP | Age: 37
End: 2022-09-22
Payer: COMMERCIAL

## 2022-09-22 VITALS
HEART RATE: 80 BPM | BODY MASS INDEX: 37.21 KG/M2 | HEIGHT: 63 IN | TEMPERATURE: 98.6 F | WEIGHT: 210 LBS | OXYGEN SATURATION: 96 % | DIASTOLIC BLOOD PRESSURE: 84 MMHG | RESPIRATION RATE: 20 BRPM | SYSTOLIC BLOOD PRESSURE: 120 MMHG

## 2022-09-22 DIAGNOSIS — J32.9 RECURRENT SINUSITIS: ICD-10-CM

## 2022-09-22 PROCEDURE — 99213 OFFICE O/P EST LOW 20 MIN: CPT | Performed by: FAMILY MEDICINE

## 2022-09-22 RX ORDER — AZELASTINE 1 MG/ML
1 SPRAY, METERED NASAL 2 TIMES DAILY
Qty: 30 ML | Refills: 0 | Status: SHIPPED | OUTPATIENT
Start: 2022-09-22

## 2022-09-22 ASSESSMENT — FIBROSIS 4 INDEX: FIB4 SCORE: 0.52

## 2022-09-22 NOTE — LETTER
September 22, 2022    To Whom It May Concern:         This is confirmation that Eulalia Rendon attended her scheduled appointment with Shani Tomas M.D. on 9/22/22. She may return to work tomorrow without any restrictions.         If you have any questions please do not hesitate to call me at the phone number listed below.    Sincerely,          Shani Tomas M.D.  [phone]

## 2022-09-23 NOTE — PROGRESS NOTES
"  Subjective:      37 y.o. female presents to urgent care for concern-year-old runny nose, postnasal drip, and cough.  No other cold symptoms. She was seen 9/8/2022 for the same symptoms.  She was diagnosed with acute sinusitis and given prescription for Augmentin and Flonase.  She completed the Augmentin without any change in symptoms.  She did not yet take the Flonase.  No history of environmental allergies, although prior to symptom onset was taking Benadryl nightly to help sleep.    She denies any other questions or concerns at this time.    Current problem list, medication, and past medical/surgical history were reviewed in Epic.    ROS  See HPI     Objective:      /84 (BP Location: Left arm, Patient Position: Sitting, BP Cuff Size: Adult)   Pulse 80   Temp 37 °C (98.6 °F) (Temporal)   Resp 20   Ht 1.6 m (5' 3\")   Wt 95.3 kg (210 lb)   SpO2 96%   BMI 37.20 kg/m²     Physical Exam  Constitutional:       General: She is not in acute distress.     Appearance: She is not diaphoretic.   HENT:      Right Ear: Tympanic membrane, ear canal and external ear normal.      Left Ear: Tympanic membrane, ear canal and external ear normal.      Nose:      Right Sinus: Maxillary sinus tenderness present. No frontal sinus tenderness.      Left Sinus: Maxillary sinus tenderness present. No frontal sinus tenderness.      Mouth/Throat:      Tongue: Tongue does not deviate from midline.      Palate: No lesions.      Pharynx: Uvula midline. No posterior oropharyngeal erythema.      Tonsils: No tonsillar exudate.   Cardiovascular:      Rate and Rhythm: Normal rate and regular rhythm.      Heart sounds: Normal heart sounds.   Pulmonary:      Effort: Pulmonary effort is normal. No respiratory distress.      Breath sounds: Normal breath sounds.   Neurological:      Mental Status: She is alert.   Psychiatric:         Mood and Affect: Affect normal.         Judgment: Judgment normal.     Assessment/Plan:     1. Recurrent " sinusitis  Symptoms did not change or improve at all with antibiotics.  Feel this is more likely related to environmental reasons rather than bacterial.  Prescription for Astelin has been sent.  She was also encouraged to use Flonase and a daily, nondrowsy antihistamine.  - azelastine (ASTELIN) 137 MCG/SPRAY nasal spray; Administer 1 Spray into affected nostril(S) 2 times a day.  Dispense: 30 mL; Refill: 0      Instructed to return to Urgent Care or nearest Emergency Department if symptoms fail to improve, for any change in condition, further concerns, or new concerning symptoms. Patient states understanding of the plan of care and discharge instructions.    Shani Tomas M.D.

## 2022-10-06 ENCOUNTER — HOSPITAL ENCOUNTER (EMERGENCY)
Facility: MEDICAL CENTER | Age: 37
End: 2022-10-06
Attending: EMERGENCY MEDICINE
Payer: MEDICAID

## 2022-10-06 ENCOUNTER — APPOINTMENT (OUTPATIENT)
Dept: RADIOLOGY | Facility: MEDICAL CENTER | Age: 37
End: 2022-10-06
Attending: EMERGENCY MEDICINE
Payer: MEDICAID

## 2022-10-06 VITALS
TEMPERATURE: 98.2 F | SYSTOLIC BLOOD PRESSURE: 101 MMHG | DIASTOLIC BLOOD PRESSURE: 68 MMHG | HEIGHT: 63 IN | OXYGEN SATURATION: 94 % | HEART RATE: 74 BPM | RESPIRATION RATE: 23 BRPM | BODY MASS INDEX: 37.23 KG/M2 | WEIGHT: 210.1 LBS

## 2022-10-06 DIAGNOSIS — R52 BODY ACHES: ICD-10-CM

## 2022-10-06 DIAGNOSIS — U07.1 COVID-19: ICD-10-CM

## 2022-10-06 LAB
FLUAV RNA SPEC QL NAA+PROBE: NEGATIVE
FLUBV RNA SPEC QL NAA+PROBE: NEGATIVE
RSV RNA SPEC QL NAA+PROBE: NEGATIVE
S PYO DNA SPEC NAA+PROBE: NOT DETECTED
SARS-COV-2 RNA RESP QL NAA+PROBE: DETECTED
SPECIMEN SOURCE: ABNORMAL

## 2022-10-06 PROCEDURE — C9803 HOPD COVID-19 SPEC COLLECT: HCPCS | Performed by: EMERGENCY MEDICINE

## 2022-10-06 PROCEDURE — 71046 X-RAY EXAM CHEST 2 VIEWS: CPT

## 2022-10-06 PROCEDURE — 0241U HCHG SARS-COV-2 COVID-19 NFCT DS RESP RNA 4 TRGT MIC: CPT

## 2022-10-06 PROCEDURE — 87651 STREP A DNA AMP PROBE: CPT

## 2022-10-06 PROCEDURE — 96374 THER/PROPH/DIAG INJ IV PUSH: CPT

## 2022-10-06 PROCEDURE — 99284 EMERGENCY DEPT VISIT MOD MDM: CPT

## 2022-10-06 PROCEDURE — 700111 HCHG RX REV CODE 636 W/ 250 OVERRIDE (IP): Performed by: EMERGENCY MEDICINE

## 2022-10-06 RX ORDER — KETOROLAC TROMETHAMINE 30 MG/ML
30 INJECTION, SOLUTION INTRAMUSCULAR; INTRAVENOUS ONCE
Status: COMPLETED | OUTPATIENT
Start: 2022-10-06 | End: 2022-10-06

## 2022-10-06 RX ORDER — ONDANSETRON 2 MG/ML
4 INJECTION INTRAMUSCULAR; INTRAVENOUS ONCE
Status: DISCONTINUED | OUTPATIENT
Start: 2022-10-06 | End: 2022-10-06 | Stop reason: HOSPADM

## 2022-10-06 RX ADMIN — KETOROLAC TROMETHAMINE 30 MG: 30 INJECTION, SOLUTION INTRAMUSCULAR at 07:15

## 2022-10-06 ASSESSMENT — PAIN DESCRIPTION - PAIN TYPE: TYPE: ACUTE PAIN

## 2022-10-06 ASSESSMENT — ENCOUNTER SYMPTOMS
HEADACHES: 0
SHORTNESS OF BREATH: 0
CONSTIPATION: 0
SORE THROAT: 1
NAUSEA: 1
DIARRHEA: 1
CHILLS: 1
VOMITING: 0
ABDOMINAL PAIN: 0
MYALGIAS: 1
COUGH: 1
FEVER: 1

## 2022-10-06 ASSESSMENT — FIBROSIS 4 INDEX: FIB4 SCORE: 0.52

## 2022-10-06 NOTE — ED NOTES
Patient presents to the ED for fever and body aches that the patient states started 3 days ago. Patient states that when she was home, her thermometer read 100.9F. Upon assessment patient's temperature is 100.2 F. Patient states she also has a sore throat, has been coughing for the last three days, and has night sweats and body aches. Patient states she has been having runny stools that started a day ago. Patient states she has been feeling nauseas but has not vomited. Patient denies chest pain and shortness of breath at this time. Patient also states that she was diagnosed with pneumonia and a sinus infection at an urgent care three weeks ago and has been taking the antibiotics she was prescribed by her provider.

## 2022-10-06 NOTE — ED NOTES
Discharge instructions given to pt with verbalized understanding.  Pt ambulatory out of the ER with mask on.

## 2022-10-06 NOTE — ED TRIAGE NOTES
"Chief Complaint   Patient presents with    Fever     38 y/o female presents with a chief complaint of a fever. Per patient she has had a fever over the past three days which she has been treating with tylenol, and ibuprofen. Per patient her last doses were just prior to her arrival. Patient stated that she has also has diarrhea, and nausea over the past three days but denied any vomiting. Patient stated that she was on Augmentin for pneumonia 3 weeks ago. Patient stated that she is also still having a wet cough.     Diarrhea    Nausea     /83   Pulse 98   Temp 37.9 °C (100.2 °F) (Oral)   Resp 18   Ht 1.6 m (5' 3\")   Wt 95.3 kg (210 lb 1.6 oz)   LMP  (LMP Unknown)   SpO2 97%   BMI 37.22 kg/m²     "

## 2022-10-06 NOTE — ED PROVIDER NOTES
ED Provider Note    ED Provider Note    Primary care provider: Pcp Pt States None  Means of arrival: POV  History obtained from: patient  History limited by: None    CHIEF COMPLAINT  Chief Complaint   Patient presents with    Fever     36 y/o female presents with a chief complaint of a fever. Per patient she has had a fever over the past three days which she has been treating with tylenol, and ibuprofen. Per patient her last doses were just prior to her arrival. Patient stated that she has also has diarrhea, and nausea over the past three days but denied any vomiting. Patient stated that she was on Augmentin for pneumonia 3 weeks ago. Patient stated that she is also still having a wet cough.     Diarrhea    Nausea       HPI  Eulalia Rendon is a 37 y.o. female who presents to the Emergency Department with a chief complaint of body aches.  Patient also reports that she has had a fever for the last 3 days as high as 100.9 at home.  She complains of cough, sore throat and congestion.  She also has associated nausea and diarrhea but no vomiting and no urinary complaints.  She has diffuse myalgias.  She recently was placed on Augmentin for a sinus infection.  She was seen in the emergency department 3 weeks ago, had a chest x-ray which she states was unrevealing but was told to continue her Augmentin and she does feel as though it made her better.  She is never had COVID.  She denies any past medical history.  She is not sexually active.  She said history of a .  She is also had an ablation for SVT.  This was successful and she has not had any further episodes of SVT in the last 6 years, since her ablation.  She has been taking Tylenol alternating with ibuprofen.    REVIEW OF SYSTEMS  Review of Systems   Constitutional:  Positive for chills and fever.   HENT:  Positive for congestion and sore throat.    Respiratory:  Positive for cough. Negative for shortness of breath.    Cardiovascular:  Negative for  chest pain.   Gastrointestinal:  Positive for diarrhea and nausea. Negative for abdominal pain, constipation and vomiting.   Genitourinary:  Negative for dysuria, frequency and urgency.   Musculoskeletal:  Positive for myalgias.   Neurological:  Negative for headaches.   All other systems reviewed and are negative.    PAST MEDICAL HISTORY   has a past medical history of Anxiety, Arthritis, C. difficile colitis, Colitis, Depression, Ear infection, Esophagitis, Headache(784.0), Infectious disease, Psychiatric disorder, Sinus infection, SVT (supraventricular tachycardia) (HCC), SVT (supraventricular tachycardia) (HCC) (12/21/2012), and Urinary tract infection, site not specified.    SURGICAL HISTORY   has a past surgical history that includes gyn surgery; primary c section (2004); repeat c section w tubal ligation (7/15/2013); other cardiac surgery; exploratory laparotomy (12/18/2016); and dilation and evacuation (12/18/2016).    SOCIAL HISTORY  Social History     Tobacco Use    Smoking status: Every Day     Packs/day: 0.50     Types: Cigarettes    Smokeless tobacco: Never   Vaping Use    Vaping Use: Never used   Substance Use Topics    Alcohol use: Not Currently     Comment: denies    Drug use: Not Currently     Comment: edibles      Social History     Substance and Sexual Activity   Drug Use Not Currently    Comment: edibles       FAMILY HISTORY  Family History   Problem Relation Age of Onset    Thyroid Mother     Other Mother         svt    Hyperlipidemia Mother     Hyperlipidemia Father     Hypertension Father     Diabetes Father         pills    Alcohol/Drug Father     Thyroid Sister     Other Brother         heart murmur    Stroke Paternal Grandfather     Heart Attack Paternal Grandfather     Cancer Paternal Grandfather         prostate       CURRENT MEDICATIONS  Home Medications    **Home medications have not yet been reviewed for this encounter**         ALLERGIES  Allergies   Allergen Reactions    Clindamycin  "Hives       PHYSICAL EXAM  VITAL SIGNS: /68   Pulse 74   Temp 36.8 °C (98.2 °F) (Temporal)   Resp (!) 23   Ht 1.6 m (5' 3\")   Wt 95.3 kg (210 lb 1.6 oz)   LMP  (LMP Unknown)   SpO2 94%   BMI 37.22 kg/m²   Vitals reviewed.  Constitutional: Patient is oriented to person, place, and time. Appears well-developed and well-nourished. Mild distress.    Head: Normocephalic and atraumatic.   Ears: Normal external ears bilaterally. TMs normal bilaterally  Mouth/Throat: Oropharynx is clear and moist, no exudates.   Eyes: Conjunctivae are normal. Pupils are equal and round.  Neck: Normal range of motion. Neck supple.  Cardiovascular: Normal rate, regular rhythm and normal heart sounds.   Pulmonary/Chest: Effort normal and breath sounds normal. No respiratory distress, no wheezes, rhonchi, or rales. No chest wall tenderness.  Abdominal: Soft. Bowel sounds are normal. There is no tenderness. No rebound or guarding, or peritoneal signs. No CVA tenderness.  Musculoskeletal: No edema. Diffuse myalgias.  Lymphadenopathy: No cervical adenopathy.   Neurological: No focal deficits.   Skin: Skin is warm and dry. No erythema. No pallor.   Psychiatric: Patient has a normal mood and affect.     LABS  Results for orders placed or performed during the hospital encounter of 10/06/22   COV-2, FLU A/B, AND RSV BY PCR (2-4 HOURS CEPHEID): Collect NP swab in VTM    Specimen: Nasopharyngeal; Respirate   Result Value Ref Range    Influenza virus A RNA Negative Negative    Influenza virus B, PCR Negative Negative    RSV, PCR Negative Negative    SARS-CoV-2 by PCR DETECTED (AA)     SARS-CoV-2 Source NP Swab    Group A Strep by PCR    Specimen: Throat   Result Value Ref Range    Group A Strep by PCR Not Detected Not Detected       All labs reviewed by me.    RADIOLOGY  DX-CHEST-2 VIEWS   Final Result         1.  No acute cardiopulmonary disease.        The radiologist's interpretation of all radiological studies have been reviewed by " me.    COURSE & MEDICAL DECISION MAKING  Pertinent Labs & Imaging studies reviewed. (See chart for details)    Obtained and reviewed past medical records.  Patient's last encounter was September 22, 2022 was seen in the urgent care for runny nose and postnasal drip. She was diagnosised with recurrent sinusitis. Last seen in ED 7/35/2022 for flank pain, dysuria and back pain.    6:43 AM - Patient seen and examined at bedside.  Is a pleasant 37-year-old female.  She presents to the emergency department with 3 days of low-grade temperature and body aches.  She also has some cough and congestion.  She is concerned about a recurrence of pneumonia although it does not sound like she ever likely had pneumonia.  Her chest x-ray was clear when she was previously given this diagnosis but it was recommended that she continued her Augmentin which was previously prescribed for a sinus infection.  She did improve, this was 3 weeks ago.  Of ordered strep testing, COVID testing as well as chest x-ray for further evaluation.    7:56 AM patient is reevaluated at the bedside.  Her vital signs are reassuring.  She tests negative for strep and positive for COVID.  Her chest x-ray is reassuring.  She is not hypoxic.  We discussed these results.  She is about 3 days into her symptomatology and we discussed CDC guidelines.  She is well-appearing and nontoxic and she is discharged home in stable condition.      FINAL IMPRESSION  1. COVID-19    2. Body aches

## 2022-10-09 ENCOUNTER — HOSPITAL ENCOUNTER (EMERGENCY)
Facility: MEDICAL CENTER | Age: 37
End: 2022-10-09
Attending: STUDENT IN AN ORGANIZED HEALTH CARE EDUCATION/TRAINING PROGRAM
Payer: MEDICAID

## 2022-10-09 VITALS
DIASTOLIC BLOOD PRESSURE: 78 MMHG | RESPIRATION RATE: 18 BRPM | TEMPERATURE: 98.5 F | HEART RATE: 87 BPM | WEIGHT: 211.42 LBS | HEIGHT: 63 IN | OXYGEN SATURATION: 99 % | SYSTOLIC BLOOD PRESSURE: 127 MMHG | BODY MASS INDEX: 37.46 KG/M2

## 2022-10-09 DIAGNOSIS — H66.001 NON-RECURRENT ACUTE SUPPURATIVE OTITIS MEDIA OF RIGHT EAR WITHOUT SPONTANEOUS RUPTURE OF TYMPANIC MEMBRANE: ICD-10-CM

## 2022-10-09 DIAGNOSIS — H66.012 NON-RECURRENT ACUTE SUPPURATIVE OTITIS MEDIA OF LEFT EAR WITH SPONTANEOUS RUPTURE OF TYMPANIC MEMBRANE: ICD-10-CM

## 2022-10-09 PROCEDURE — A9270 NON-COVERED ITEM OR SERVICE: HCPCS | Performed by: STUDENT IN AN ORGANIZED HEALTH CARE EDUCATION/TRAINING PROGRAM

## 2022-10-09 PROCEDURE — 99282 EMERGENCY DEPT VISIT SF MDM: CPT

## 2022-10-09 PROCEDURE — 700102 HCHG RX REV CODE 250 W/ 637 OVERRIDE(OP): Performed by: STUDENT IN AN ORGANIZED HEALTH CARE EDUCATION/TRAINING PROGRAM

## 2022-10-09 RX ORDER — AMOXICILLIN AND CLAVULANATE POTASSIUM 875; 125 MG/1; MG/1
1 TABLET, FILM COATED ORAL ONCE
Status: COMPLETED | OUTPATIENT
Start: 2022-10-09 | End: 2022-10-09

## 2022-10-09 RX ORDER — AMOXICILLIN AND CLAVULANATE POTASSIUM 562.5; 437.5; 62.5 MG/1; MG/1; MG/1
2 TABLET, MULTILAYER, EXTENDED RELEASE ORAL 2 TIMES DAILY
Qty: 28 TABLET | Refills: 0 | Status: SHIPPED | OUTPATIENT
Start: 2022-10-09 | End: 2022-10-16

## 2022-10-09 RX ADMIN — AMOXICILLIN AND CLAVULANATE POTASSIUM 1 TABLET: 875; 125 TABLET, FILM COATED ORAL at 02:30

## 2022-10-09 ASSESSMENT — FIBROSIS 4 INDEX: FIB4 SCORE: 0.52

## 2022-10-09 ASSESSMENT — PAIN DESCRIPTION - DESCRIPTORS: DESCRIPTORS: SORE;TENDER

## 2022-10-09 NOTE — ED NOTES
Patient ambulates to the ER complaining of right ear left eye pain since yesterday, a cough for over a month. States she tested covid positive a few days ago when she was here last, and before that she had pneumonia and a sinus infection

## 2022-10-09 NOTE — ED TRIAGE NOTES
"Chief Complaint   Patient presents with    Eye Pain    Red Eye    Ear Pain     PT IS AAOX4, NAD. PT CAME TO THE ER FROM HOME C/O OF RT EAR PAIN AND LT EYE PAIN, NO TRAUMA. PT HAS COVID X 2 DAYS.    /  /89   Pulse 84   Temp 37.2 °C (99 °F) (Temporal)   Resp 16   Ht 1.6 m (5' 3\")   Wt 95.9 kg (211 lb 6.7 oz)   SpO2 97% /  "

## 2022-10-09 NOTE — DISCHARGE PLANNING
Anticipated Discharge Disposition: Home    Action: ER CM called. Pt in [facility]. She is having issues with rx. She left prior to being seen in the ER by CM. Called her. [employer]. [employer] [facility]  listed. She notes cancelled. ER CM spoke with Karen at [facility] she is assisting with RX and it is at No [employer]. [employer] terminated 7.31.22. Good RX 42.72 provided to pharmacy and this ER CM numbers provided for assist.    Barriers to Discharge: None    Plan: No further needs
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
## 2022-10-09 NOTE — ED NOTES
PT IS AAOX4, NAD. PT IS BEING D/C. PT WAS GIVEN D/C INSTRUCTIONS AND SHE STATED UNDERSTANDING. PT IS ABLE TO AMB WOI ASSISTANCE.

## 2023-01-09 ENCOUNTER — HOSPITAL ENCOUNTER (EMERGENCY)
Facility: MEDICAL CENTER | Age: 38
End: 2023-01-09
Attending: EMERGENCY MEDICINE
Payer: MEDICAID

## 2023-01-09 VITALS
HEIGHT: 63 IN | TEMPERATURE: 98.1 F | BODY MASS INDEX: 37.39 KG/M2 | OXYGEN SATURATION: 97 % | RESPIRATION RATE: 20 BRPM | WEIGHT: 211 LBS | DIASTOLIC BLOOD PRESSURE: 79 MMHG | SYSTOLIC BLOOD PRESSURE: 120 MMHG | HEART RATE: 82 BPM

## 2023-01-09 DIAGNOSIS — J32.9 CHRONIC SINUSITIS, UNSPECIFIED LOCATION: ICD-10-CM

## 2023-01-09 DIAGNOSIS — B34.9 VIRAL SYNDROME: ICD-10-CM

## 2023-01-09 LAB
EKG IMPRESSION: NORMAL
FLUAV RNA SPEC QL NAA+PROBE: NEGATIVE
FLUBV RNA SPEC QL NAA+PROBE: NEGATIVE
RSV RNA SPEC QL NAA+PROBE: NEGATIVE
SARS-COV-2 RNA RESP QL NAA+PROBE: NOTDETECTED
SPECIMEN SOURCE: 1

## 2023-01-09 PROCEDURE — 93005 ELECTROCARDIOGRAM TRACING: CPT | Performed by: EMERGENCY MEDICINE

## 2023-01-09 PROCEDURE — 99283 EMERGENCY DEPT VISIT LOW MDM: CPT

## 2023-01-09 PROCEDURE — C9803 HOPD COVID-19 SPEC COLLECT: HCPCS | Performed by: EMERGENCY MEDICINE

## 2023-01-09 PROCEDURE — A9270 NON-COVERED ITEM OR SERVICE: HCPCS | Performed by: EMERGENCY MEDICINE

## 2023-01-09 PROCEDURE — 700102 HCHG RX REV CODE 250 W/ 637 OVERRIDE(OP): Performed by: EMERGENCY MEDICINE

## 2023-01-09 PROCEDURE — 93005 ELECTROCARDIOGRAM TRACING: CPT

## 2023-01-09 PROCEDURE — 0241U HCHG SARS-COV-2 COVID-19 NFCT DS RESP RNA 4 TRGT MIC: CPT

## 2023-01-09 RX ORDER — BENZONATATE 100 MG/1
200 CAPSULE ORAL ONCE
Status: COMPLETED | OUTPATIENT
Start: 2023-01-09 | End: 2023-01-09

## 2023-01-09 RX ORDER — GUAIFENESIN 1200 MG/1
1200 TABLET, EXTENDED RELEASE ORAL 2 TIMES DAILY
Qty: 14 TABLET | Refills: 0 | Status: SHIPPED | OUTPATIENT
Start: 2023-01-09 | End: 2023-01-16

## 2023-01-09 RX ORDER — BENZONATATE 100 MG/1
200 CAPSULE ORAL 3 TIMES DAILY PRN
Qty: 42 CAPSULE | Refills: 0 | Status: SHIPPED | OUTPATIENT
Start: 2023-01-09 | End: 2023-01-16

## 2023-01-09 RX ADMIN — BENZONATATE 200 MG: 100 CAPSULE ORAL at 20:30

## 2023-01-09 ASSESSMENT — FIBROSIS 4 INDEX: FIB4 SCORE: 0.52

## 2023-01-10 NOTE — ED NOTES
Patient discharged home in stable condition with family  AVS provided with recommended follow up and home care instructions and education information  No prescriptions provided at this time; discussed use of OTC medications and adjunct therapies to help with symptoms  Work excuse provided  Patient verbalized understanding  Ambulatory at time of discharge

## 2023-01-10 NOTE — ED TRIAGE NOTES
"Patient presents to the ER with the following complaints:    Chief Complaint   Patient presents with    Shortness of Breath     Onset two days ago. Progressively worsening. Started as a sinus infection 4 months ago. Tried a lot of over the counter medication without relief.     Cough     Onset 4 months ago. Productive and wet in nature. Minimal relief with otc medications.        /79   Pulse 82   Temp 36.7 °C (98.1 °F) (Temporal)   Resp 20   Ht 1.6 m (5' 3\")   Wt 95.7 kg (211 lb)   LMP 12/15/2022 (Approximate)   SpO2 97%   BMI 37.38 kg/m²       "
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
## 2023-01-10 NOTE — DISCHARGE INSTRUCTIONS
Given your symptoms, the likelihood of a viral illness is high. You can check your test results late tonight or in the morning through [facility]. The immune system is built to clear this type of infection. Antibiotics will not change the course of this type of infection. Therapy for viral infections is fluids, rest, fever control, supportive care, and frequent hand washing to avoid spread of the illness. Steam from a shower or bath a cool mist humidifier, if you have one, can be helpful. Slightly elevating the head of the bed to help drain mucus can also give some relief. Viral illnesses can last 7-14 days and occasionally longer. Close observation of the patient, and returning to a doctor for severe symptoms remain important.

## 2023-07-28 ENCOUNTER — APPOINTMENT (OUTPATIENT)
Dept: RADIOLOGY | Facility: MEDICAL CENTER | Age: 38
End: 2023-07-28
Attending: EMERGENCY MEDICINE
Payer: MEDICAID

## 2023-07-28 ENCOUNTER — HOSPITAL ENCOUNTER (EMERGENCY)
Facility: MEDICAL CENTER | Age: 38
End: 2023-07-28
Attending: EMERGENCY MEDICINE
Payer: MEDICAID

## 2023-07-28 VITALS
RESPIRATION RATE: 16 BRPM | BODY MASS INDEX: 37.3 KG/M2 | TEMPERATURE: 98.1 F | OXYGEN SATURATION: 97 % | HEART RATE: 80 BPM | DIASTOLIC BLOOD PRESSURE: 83 MMHG | WEIGHT: 210.54 LBS | SYSTOLIC BLOOD PRESSURE: 135 MMHG | HEIGHT: 63 IN

## 2023-07-28 DIAGNOSIS — R44.8 FACIAL PRESSURE: ICD-10-CM

## 2023-07-28 LAB
ALBUMIN SERPL BCP-MCNC: 4.3 G/DL (ref 3.2–4.9)
ALBUMIN/GLOB SERPL: 1.5 G/DL
ALP SERPL-CCNC: 74 U/L (ref 30–99)
ALT SERPL-CCNC: 22 U/L (ref 2–50)
ANION GAP SERPL CALC-SCNC: 10 MMOL/L (ref 7–16)
AST SERPL-CCNC: 20 U/L (ref 12–45)
BASOPHILS # BLD AUTO: 0.3 % (ref 0–1.8)
BASOPHILS # BLD: 0.03 K/UL (ref 0–0.12)
BILIRUB SERPL-MCNC: 0.3 MG/DL (ref 0.1–1.5)
BUN SERPL-MCNC: 10 MG/DL (ref 8–22)
CALCIUM ALBUM COR SERPL-MCNC: 9.2 MG/DL (ref 8.5–10.5)
CALCIUM SERPL-MCNC: 9.4 MG/DL (ref 8.4–10.2)
CHLORIDE SERPL-SCNC: 104 MMOL/L (ref 96–112)
CO2 SERPL-SCNC: 23 MMOL/L (ref 20–33)
CREAT SERPL-MCNC: 0.83 MG/DL (ref 0.5–1.4)
D DIMER PPP IA.FEU-MCNC: <0.27 UG/ML (FEU) (ref 0–0.5)
EKG IMPRESSION: NORMAL
EOSINOPHIL # BLD AUTO: 0.17 K/UL (ref 0–0.51)
EOSINOPHIL NFR BLD: 1.8 % (ref 0–6.9)
ERYTHROCYTE [DISTWIDTH] IN BLOOD BY AUTOMATED COUNT: 40.1 FL (ref 35.9–50)
GFR SERPLBLD CREATININE-BSD FMLA CKD-EPI: 92 ML/MIN/1.73 M 2
GLOBULIN SER CALC-MCNC: 2.9 G/DL (ref 1.9–3.5)
GLUCOSE SERPL-MCNC: 98 MG/DL (ref 65–99)
HCT VFR BLD AUTO: 43.4 % (ref 37–47)
HGB BLD-MCNC: 14.5 G/DL (ref 12–16)
IMM GRANULOCYTES # BLD AUTO: 0.05 K/UL (ref 0–0.11)
IMM GRANULOCYTES NFR BLD AUTO: 0.5 % (ref 0–0.9)
LIPASE SERPL-CCNC: 43 U/L (ref 11–82)
LYMPHOCYTES # BLD AUTO: 3.06 K/UL (ref 1–4.8)
LYMPHOCYTES NFR BLD: 32.6 % (ref 22–41)
MCH RBC QN AUTO: 30.3 PG (ref 27–33)
MCHC RBC AUTO-ENTMCNC: 33.4 G/DL (ref 32.2–35.5)
MCV RBC AUTO: 90.6 FL (ref 81.4–97.8)
MONOCYTES # BLD AUTO: 0.66 K/UL (ref 0–0.85)
MONOCYTES NFR BLD AUTO: 7 % (ref 0–13.4)
NEUTROPHILS # BLD AUTO: 5.42 K/UL (ref 1.82–7.42)
NEUTROPHILS NFR BLD: 57.8 % (ref 44–72)
NRBC # BLD AUTO: 0 K/UL
NRBC BLD-RTO: 0 /100 WBC (ref 0–0.2)
PLATELET # BLD AUTO: 329 K/UL (ref 164–446)
PMV BLD AUTO: 9 FL (ref 9–12.9)
POTASSIUM SERPL-SCNC: 4.4 MMOL/L (ref 3.6–5.5)
PROT SERPL-MCNC: 7.2 G/DL (ref 6–8.2)
RBC # BLD AUTO: 4.79 M/UL (ref 4.2–5.4)
SODIUM SERPL-SCNC: 137 MMOL/L (ref 135–145)
TROPONIN T SERPL-MCNC: <6 NG/L (ref 6–19)
WBC # BLD AUTO: 9.4 K/UL (ref 4.8–10.8)

## 2023-07-28 PROCEDURE — 84484 ASSAY OF TROPONIN QUANT: CPT

## 2023-07-28 PROCEDURE — 93005 ELECTROCARDIOGRAM TRACING: CPT | Performed by: EMERGENCY MEDICINE

## 2023-07-28 PROCEDURE — 71045 X-RAY EXAM CHEST 1 VIEW: CPT

## 2023-07-28 PROCEDURE — 80053 COMPREHEN METABOLIC PANEL: CPT

## 2023-07-28 PROCEDURE — 85379 FIBRIN DEGRADATION QUANT: CPT

## 2023-07-28 PROCEDURE — 85025 COMPLETE CBC W/AUTO DIFF WBC: CPT

## 2023-07-28 PROCEDURE — 99283 EMERGENCY DEPT VISIT LOW MDM: CPT

## 2023-07-28 PROCEDURE — 36415 COLL VENOUS BLD VENIPUNCTURE: CPT

## 2023-07-28 PROCEDURE — 83690 ASSAY OF LIPASE: CPT

## 2023-07-28 ASSESSMENT — FIBROSIS 4 INDEX: FIB4 SCORE: 0.53

## 2023-07-28 NOTE — ED PROVIDER NOTES
ED Provider Note    CHIEF COMPLAINT  Chief Complaint   Patient presents with    Chest Pressure     39 yo female ambulates to triage with reports of left sided chest pressure that radiates into her left arm and face, symptoms started yesterday.  Denies n/v/d or SOB.  Reports she has been sweating at night,  Denies cough       EXTERNAL RECORDS REVIEWED  Outpatient Notes from urgent care September 2022 for URI symptoms, also prior emergency department visits in January for similar    HPI/ROS  LIMITATION TO HISTORY   Select: : None  OUTSIDE HISTORIAN(S):  none    Eulalai Malathi Rendon is a 38 y.o. female who presents with left arm pressure as well as pressure to the left side of her face and upper chest.  Patient reports that she first noted symptoms yesterday and has continued today.  It is more of a pressure sensation no real pain to those areas.  No focal weakness or numbness or tingling to the face or arm.  The pressure itself seems to be like a tightness in her neck and then pressure going to her face and arm.  No actual headaches.  No fevers or chills.  No cough or congestion.  No shortness of breath.  No leg pain or swelling.  No abdominal pain, nausea or vomiting    Has had some chronic issues with sinus drainage after COVID19 and has been referred to ENT     PAST MEDICAL HISTORY   has a past medical history of Anxiety, Arthritis, C. difficile colitis, Colitis, Depression, Ear infection, Esophagitis, Headache(784.0), Infectious disease, Psychiatric disorder, Sinus infection, SVT (supraventricular tachycardia) (HCC), SVT (supraventricular tachycardia) (HCC) (12/21/2012), and Urinary tract infection, site not specified.    SURGICAL HISTORY   has a past surgical history that includes gyn surgery; primary c section (2004); repeat c section w tubal ligation (7/15/2013); other cardiac surgery; exploratory laparotomy (12/18/2016); and dilation and evacuation (12/18/2016).    FAMILY HISTORY  Family History   Problem  "Relation Age of Onset    Thyroid Mother     Other Mother         svt    Hyperlipidemia Mother     Hyperlipidemia Father     Hypertension Father     Diabetes Father         pills    Alcohol/Drug Father     Thyroid Sister     Other Brother         heart murmur    Stroke Paternal Grandfather     Heart Attack Paternal Grandfather     Cancer Paternal Grandfather         prostate       SOCIAL HISTORY  Social History     Tobacco Use    Smoking status: Former     Packs/day: 0.50     Types: Cigarettes    Smokeless tobacco: Never   Vaping Use    Vaping Use: Every day    Substances: Nicotine   Substance and Sexual Activity    Alcohol use: Not Currently     Comment: denies    Drug use: Not Currently     Comment: edibles    Sexual activity: Yes     Partners: Male     Comment: none       CURRENT MEDICATIONS  Home Medications       Reviewed by Phuong Duenas R.N. (Registered Nurse) on 07/28/23 at 1459  Med List Status: Not Addressed     Medication Last Dose Status   azelastine (ASTELIN) 137 MCG/SPRAY nasal spray  Active   benzonatate (TESSALON) 100 MG Cap  Active   Chlorpheniramine Maleate (ALLERGY PO)  Active   Cholecalciferol (VITAMIN D3 PO)  Active   Cyanocobalamin (VITAMIN B-12 PO)  Active   diphenhydrAMINE HCl (BENADRYL PO)  Active   escitalopram (LEXAPRO) 20 MG tablet  Active                    ALLERGIES  Allergies   Allergen Reactions    Clindamycin Hives       PHYSICAL EXAM  VITAL SIGNS: BP (!) 140/88   Pulse 72   Temp 36.8 °C (98.2 °F) (Temporal)   Resp 18   Ht 1.6 m (5' 3\")   Wt 95.5 kg (210 lb 8.6 oz)   LMP 07/28/2023 (Exact Date)   SpO2 96%   BMI 37.30 kg/m²    Constitutional: Alert in no apparent distress.  HENT: No signs of trauma, Bilateral external ears normal, Nose normal.  Sinuses and temples are nontender, minimal fluid in the left TM, no erythema  Eyes: Pupils are equal and reactive, Conjunctiva normal, Non-icteric.   Neck: Normal range of motion, No tenderness, Supple, No stridor.   Lymphatic: No " lymphadenopathy noted.   Cardiovascular: Regular rate and rhythm, no murmurs.  Strong radial pulses bilaterally  Thorax & Lungs: Normal breath sounds, No respiratory distress, No wheezing, No chest tenderness.   Abdomen: Bowel sounds normal, Soft, No tenderness, No masses, No pulsatile masses. No peritoneal signs.  Skin: Warm, Dry, No erythema, No rash.   Back: No bony tenderness, No CVA tenderness.   Extremities: Intact distal pulses radial bilaterally, distill capillary refills less than 2 seconds in the hands, No edema, No tenderness, No cyanosis, Negative José Miguel's sign.  Negative Adson's test  Musculoskeletal: Good range of motion in all major joints. No tenderness to palpation or major deformities noted.   Neurologic: Alert, cranial nerves intact, speech is clear and fluent no nystagmus, no dysmetria speech is appropriate or not slurred, upper extremities bilaterally exhibit no drift, 5 out of 5 with bicep tricep and  sensation intact to light touch throughout upper extremities.  No focal deficits noted. Ambulates with steady gait  Psychiatric: Affect normal, Judgment normal, Mood normal.       DIAGNOSTIC STUDIES / PROCEDURES  Results for orders placed or performed during the hospital encounter of 07/28/23   CBC WITH DIFFERENTIAL   Result Value Ref Range    WBC 9.4 4.8 - 10.8 K/uL    RBC 4.79 4.20 - 5.40 M/uL    Hemoglobin 14.5 12.0 - 16.0 g/dL    Hematocrit 43.4 37.0 - 47.0 %    MCV 90.6 81.4 - 97.8 fL    MCH 30.3 27.0 - 33.0 pg    MCHC 33.4 32.2 - 35.5 g/dL    RDW 40.1 35.9 - 50.0 fL    Platelet Count 329 164 - 446 K/uL    MPV 9.0 9.0 - 12.9 fL    Neutrophils-Polys 57.80 44.00 - 72.00 %    Lymphocytes 32.60 22.00 - 41.00 %    Monocytes 7.00 0.00 - 13.40 %    Eosinophils 1.80 0.00 - 6.90 %    Basophils 0.30 0.00 - 1.80 %    Immature Granulocytes 0.50 0.00 - 0.90 %    Nucleated RBC 0.00 0.00 - 0.20 /100 WBC    Neutrophils (Absolute) 5.42 1.82 - 7.42 K/uL    Lymphs (Absolute) 3.06 1.00 - 4.80 K/uL    Monos  (Absolute) 0.66 0.00 - 0.85 K/uL    Eos (Absolute) 0.17 0.00 - 0.51 K/uL    Baso (Absolute) 0.03 0.00 - 0.12 K/uL    Immature Granulocytes (abs) 0.05 0.00 - 0.11 K/uL    NRBC (Absolute) 0.00 K/uL   COMP METABOLIC PANEL   Result Value Ref Range    Sodium 137 135 - 145 mmol/L    Potassium 4.4 3.6 - 5.5 mmol/L    Chloride 104 96 - 112 mmol/L    Co2 23 20 - 33 mmol/L    Anion Gap 10.0 7.0 - 16.0    Glucose 98 65 - 99 mg/dL    Bun 10 8 - 22 mg/dL    Creatinine 0.83 0.50 - 1.40 mg/dL    Calcium 9.4 8.4 - 10.2 mg/dL    Correct Calcium 9.2 8.5 - 10.5 mg/dL    AST(SGOT) 20 12 - 45 U/L    ALT(SGPT) 22 2 - 50 U/L    Alkaline Phosphatase 74 30 - 99 U/L    Total Bilirubin 0.3 0.1 - 1.5 mg/dL    Albumin 4.3 3.2 - 4.9 g/dL    Total Protein 7.2 6.0 - 8.2 g/dL    Globulin 2.9 1.9 - 3.5 g/dL    A-G Ratio 1.5 g/dL   LIPASE   Result Value Ref Range    Lipase 43 11 - 82 U/L   TROPONIN   Result Value Ref Range    Troponin T <6 6 - 19 ng/L   D-DIMER   Result Value Ref Range    D-Dimer <0.27 0.00 - 0.50 ug/mL (FEU)   ESTIMATED GFR   Result Value Ref Range    GFR (CKD-EPI) 92 >60 mL/min/1.73 m 2   EKG   Result Value Ref Range    Report       [facility]    Test Date:  2023  Pt Name:    ANNA CIFUENTES               Department: [facility]  MRN:        [id number]                      Room:       -ROOM 14  Gender:     Female                       Technician: [id number]  :        1985                   Requested By:ER TRIAGE PROTOCOL  Order #:    [id number]                    Reading MD:    Measurements  Intervals                                Axis  Rate:       65                           P:          59  MN:         137                          QRS:        69  QRSD:       87                           T:          57  QT:         375  QTc:        390    Interpretive Statements  Sinus rhythm  Compared to ECG 2023 19:38:02  No significant changes           RADIOLOGY  I have independently interpreted  the diagnostic imaging associated with this visit and am waiting the final reading from the radiologist.   My preliminary interpretation is as follows: no edema  Radiologist interpretation:   DX-CHEST-PORTABLE (1 VIEW)   Final Result         1. No acute cardiopulmonary abnormalities are identified.            COURSE & MEDICAL DECISION MAKING        INITIAL ASSESSMENT, COURSE AND PLAN  Care Narrative: 3:26 PM  Patient is evaluated at bedside, at this point she is well-appearing, differential includes arrhythmia, DVT, metabolic or electrolyte derangement, seems less likely to be ACS but this is considered, has no focal deficits on neurologic exam to suggest CVA but this was considered, thoracic outlet syndrome considered.  I have ordered for diagnostic labs, x-ray, ECG      5:04 PM  Patient is reevaluated, repeat neurologic exam with no abnormalities.  I discussed all results.  Patient would like to be discharged        ADDITIONAL PROBLEM LIST  #Facial and arm pressure.  Unclear etiology for symptoms at this time.  As above I did consider potential DVT or clot but her D-dimer is negative and there are no exam findings highly suggestive of this.  There is no evidence of arrhythmia or ischemia on ECG, troponin is negative.  Symptoms seem unlikely to be cardiac related.  There is no evidence of malperfusion on her exam.  Adson's test for thoracic outlet syndrome was unremarkable.  She has no fevers or infectious findings.  She reports a pressure rather than a numbness and has an unremarkable neurologic exam with no deficits noted either sensation or motor or other neurologic symptoms on exam.  She has no headaches.  There is no metabolic disturbance.  I did discuss with her obtaining an MRI to evaluate for potential more occult pathology but she has declined this.  She has had some recurrent sinus symptoms there is potential this may be contributing did advise decongestions, follow-up with primary care and she has  already been referred to ENT.  Patient is given return precautions which she understands well      DISPOSITION AND DISCUSSIONS    Barriers to care at this time, including but not limited to:  none .     Decision tools and prescription drugs considered including, but not limited to: D-dimer negative .    The patient will return for new or worsening symptoms and is stable at the time of discharge.    The patient is referred to a primary physician for blood pressure management, diabetic screening, and for all other preventative health concerns.    DISPOSITION:  Patient will be discharged home in stable condition.    FOLLOW UP:  With your primary care doctor            OUTPATIENT MEDICATIONS:  New Prescriptions    No medications on file         FINAL DIAGNOSIS  1. Facial pressure           Electronically signed by: Oneil Brooks M.D., 7/28/2023 3:43 PM

## 2023-07-28 NOTE — ED TRIAGE NOTES
"Chief Complaint   Patient presents with    Chest Pressure     39 yo female ambulates to triage with reports of left sided chest pressure that radiates into her left arm and face, symptoms started yesterday.  Denies n/v/d or SOB.  Reports she has been sweating at night,  Denies cough    BP (!) 140/88   Pulse 72   Temp 36.8 °C (98.2 °F) (Temporal)   Resp 18   Ht 1.6 m (5' 3\")   Wt 95.5 kg (210 lb 8.6 oz)   LMP 07/28/2023 (Exact Date)   SpO2 96%   BMI 37.30 kg/m²    "

## 2023-07-29 NOTE — ED NOTES
Patient given instructions, v/u instructions.  Patient ambulated out of ER with steady gate w/o any acute changes or complaints.

## 2023-09-08 ENCOUNTER — HOSPITAL ENCOUNTER (EMERGENCY)
Facility: MEDICAL CENTER | Age: 38
End: 2023-09-09
Attending: EMERGENCY MEDICINE
Payer: COMMERCIAL

## 2023-09-08 ENCOUNTER — APPOINTMENT (OUTPATIENT)
Dept: RADIOLOGY | Facility: MEDICAL CENTER | Age: 38
End: 2023-09-08
Attending: EMERGENCY MEDICINE
Payer: COMMERCIAL

## 2023-09-08 DIAGNOSIS — M54.50 ACUTE BILATERAL LOW BACK PAIN WITHOUT SCIATICA: ICD-10-CM

## 2023-09-08 DIAGNOSIS — M79.89 BILATERAL HAND SWELLING: ICD-10-CM

## 2023-09-08 DIAGNOSIS — M54.6 ACUTE BILATERAL THORACIC BACK PAIN: ICD-10-CM

## 2023-09-08 PROCEDURE — 99283 EMERGENCY DEPT VISIT LOW MDM: CPT

## 2023-09-08 PROCEDURE — 36415 COLL VENOUS BLD VENIPUNCTURE: CPT

## 2023-09-08 ASSESSMENT — FIBROSIS 4 INDEX: FIB4 SCORE: 0.49

## 2023-09-08 NOTE — LETTER
"  FORM C-4:  EMPLOYEE’S CLAIM FOR COMPENSATION/ REPORT OF INITIAL TREATMENT  EMPLOYEE’S CLAIM - PROVIDE ALL INFORMATION REQUESTED   First Name Eulalia Last Name Justice Birthdate 1985  Sex female Claim Number   Home Address [address]    [place]             Zip [address]                                   Age  38 y.o. 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 (if applicable)  Lifting heavy bag of birdseed   How did this injury or occupational disease occur? Be specific and answer in detail. Use additional sheet if necessary)  Lifting heavy bags with hands repetitively   If you believe that you have an occupational disease, when did you first have knowledge of the disability and it relationship to your employment? N/A Witnesses to the Accident  N/A   Nature of Injury or Occupational Disease   Part(s) of Body Injured or Affected  , ,  Hands and Back   I CERTIFY THAT THE ABOVE IS TRUE AND CORRECT TO THE BEST OF MY KNOWLEDGE AND THAT I HAVE PROVIDED THIS INFORMATION IN ORDER TO OBTAIN THE BENEFITS OF [place] INCLUSIVE OR CHAPTER " 617 OF [place]).  I HEREBY AUTHORIZE ANY PHYSICIAN, CHIROPRACTOR, SURGEON, PRACTITIONER, OR OTHER PERSON, ANY HOSPITAL, INCLUDING [facility] OR [facility], ANY MEDICAL SERVICE ORGANIZATION, ANY INSURANCE COMPANY, OR OTHER INSTITUTION OR ORGANIZATION TO RELEASE TO EACH OTHER, ANY MEDICAL OR OTHER INFORMATION, INCLUDING BENEFITS PAID OR PAYABLE, PERTINENT TO THIS INJURY OR DISEASE, EXCEPT INFORMATION RELATIVE TO DIAGNOSIS, TREATMENT AND/OR COUNSELING FOR AIDS, PSYCHOLOGICAL CONDITIONS, ALCOHOL OR CONTROLLED SUBSTANCES, FOR WHICH I MUST GIVE SPECIFIC AUTHORIZATION.  A PHOTOSTAT OF THIS AUTHORIZATION SHALL BE AS VALID AS THE ORIGINAL.  Date   9/8/2023          Place     [facility]            Employee’s Signature   THIS REPORT MUST BE COMPLETED AND MAILED WITHIN 3 WORKING DAYS OF TREATMENT   Place [facility], EMERGENCY DEPT                       Name of Facility [facility]   Date  9/8/2023 Diagnosis  (M54.6) Acute bilateral thoracic back pain, Acute  (M54.50) Acute bilateral low back pain without sciatica, Acute  (M79.89) Bilateral hand swelling, Acute Is there evidence the injured employee was under the influence of alcohol and/or another controlled substance at the time of accident?   Hour  3:09 AM Description of Injury or Disease  Acute bilateral thoracic back pain  Acute bilateral low back pain without sciatica  Bilateral hand swelling No   Treatment  Supportive care  Have you advised the patient to remain off work five days or more?         No   X-Ray Findings  Negative If Yes   From Date    To Date      From information given by the employee, together with medical evidence, can you directly connect this injury or occupational disease as job incurred?   Comments:Likely If No, is employee capable of: Full Duty  No Modified Duty  Yes   Is additional medical care by a physician indicated? Yes  Comments:Please follow-up with occupational health If Modified  "Duty, Specify any Limitations / Restrictions   Limited use of hands and limited lifting   Do you know of any previous injury or disease contributing to this condition or occupational disease? No    Date 9/9/2023 Print Doctor’s Name Oscar Martinez certify the employer’s copy of this form was mailed on:   Address [address] [address]  [phone] INSURER’S USE ONLY   Provider’s Tax ID Number [id number] Telephone Dept: [phone]    Doctor’s Signature e-SignOSCAR MARTINEZ D.O. Degree  MD      Form C-4 (rev.10/07)                                                                         BRIEF DESCRIPTION OF RIGHTS AND BENEFITS  (Pursuant to [place] 616[place])    Notice of Injury or Occupational Disease (Incident Report Form C-1): If an injury or occupational disease (OD) arises out of and in the course of employment, you must provide written notice to your employer as soon as practicable, but no later than 7 days after the accident or OD. Your employer shall maintain a sufficient supply of the required forms.    Claim for Compensation (Form C-4): If medical treatment is sought, the form C-4 is available at the place of initial treatment. A completed \"Claim for Compensation\" (Form C-4) must be filed within 90 days after an accident or OD. The treating physician or chiropractor must, within 3 working days after treatment, complete and mail to the employer, the employer's insurer and third-party , the Claim for Compensation.    Medical Treatment: If you require medical treatment for your on-the-job injury or OD, you may be required to select a physician or chiropractor from a list provided by your workers’ compensation insurer, if it has contracted with an Organization for Managed Care (MCO) or Preferred Provider Organization (PPO) or providers of health care. If your employer has not entered into a contract with an MCO or PPO, you may select a physician or chiropractor from the Panel of Physicians " and Chiropractors. Any medical costs related to your industrial injury or OD will be paid by your insurer.    Temporary Total Disability (TTD): If your doctor has certified that you are unable to work for a period of at least 5 consecutive days, or 5 cumulative days in a 20-day period, or places restrictions on you that your employer does not accommodate, you may be entitled to TTD compensation.    Temporary Partial Disability (TPD): If the wage you receive upon reemployment is less than the compensation for TTD to which you are entitled, the insurer may be required to pay you TPD compensation to make up the difference. TPD can only be paid for a maximum of 24 months.    Permanent Partial Disability (PPD): When your medical condition is stable and there is an indication of a PPD as a result of your injury or OD, within 30 days, your insurer must arrange for an evaluation by a rating physician or chiropractor to determine the degree of your PPD. The amount of your PPD award depends on the date of injury, the results of the PPD evaluation, your age and wage.    Permanent Total Disability (PTD): If you are medically certified by a treating physician or chiropractor as permanently and totally disabled and have been granted a PTD status by your insurer, you are entitled to receive monthly benefits not to exceed 66 2/3% of your average monthly wage. The amount of your PTD payments is subject to reduction if you previously received a lump-sum PPD award.    Vocational Rehabilitation Services: You may be eligible for vocational rehabilitation services if you are unable to return to the job due to a permanent physical impairment or permanent restrictions as a result of your injury or occupational disease.    Transportation and Per Vincent Reimbursement: You may be eligible for travel expenses and per vincent associated with medical treatment.    Reopening: You may be able to reopen your claim if your condition worsens after claim  closure.     Appeal Process: If you disagree with a written determination issued by the insurer or the insurer does not respond to your request, you may appeal to the Department of Administration, , by following the instructions contained in your determination letter. You must appeal the determination within 70 days from the date of the determination letter at [address], [address], [place], [place] [address], or [address], [address], [place], [place] [address]. If you disagree with the  decision, you may appeal to the Department of Administration, . You must file your appeal within 30 days from the date of the  decision letter at [address], [address], [place], [place] [address], or [address], [address], [place], [place] [address]. If you disagree with a decision of an , you may file a petition for judicial review with the District Court. You must do so within 30 days of the Appeal Officer’s decision. You may be represented by an  at your own expense or you may contact the [facility] for possible representation.    [place]  for Injured Workers (NAIW): If you disagree with a  decision, you may request that NAIW represent you without charge at an  Hearing. For information regarding denial of benefits, you may contact the [facility] at: [address], [address], [place] [address], [phone], or [address], [address], [place] [address], [phone]    To File a Complaint with the Division: If you wish to file a complaint with the  of the Division of Industrial Relations (DIR),  please contact the Workers’ Compensation Section, [address], [address], [place], [place] [address], telephone [phone], or [address], [place], [place] [address], telephone [phone].    For assistance with Workers’  Compensation Issues: You may contact the [facility] Office for Consumer Health Assistance, [address] [facility], [address], [place] [address], Toll Free [phone], Web site: http://[facility].[place].gov/Programs/LISA E-mail: lisa@[facility].[place].gov  D-2 (rev. 10/20)              __________________________________________________________________                                    _________________            Employee Name / Signature                                                                                                                            Date

## 2023-09-09 VITALS
DIASTOLIC BLOOD PRESSURE: 77 MMHG | OXYGEN SATURATION: 97 % | HEART RATE: 64 BPM | WEIGHT: 213.41 LBS | BODY MASS INDEX: 37.81 KG/M2 | HEIGHT: 63 IN | SYSTOLIC BLOOD PRESSURE: 135 MMHG | RESPIRATION RATE: 18 BRPM | TEMPERATURE: 98.2 F

## 2023-09-09 LAB
ALBUMIN SERPL BCP-MCNC: 4.4 G/DL (ref 3.2–4.9)
ALBUMIN/GLOB SERPL: 1.6 G/DL
ALP SERPL-CCNC: 68 U/L (ref 30–99)
ALT SERPL-CCNC: 20 U/L (ref 2–50)
ANION GAP SERPL CALC-SCNC: 9 MMOL/L (ref 7–16)
APPEARANCE UR: CLEAR
AST SERPL-CCNC: 20 U/L (ref 12–45)
BASOPHILS # BLD AUTO: 0.2 % (ref 0–1.8)
BASOPHILS # BLD: 0.02 K/UL (ref 0–0.12)
BILIRUB SERPL-MCNC: 0.2 MG/DL (ref 0.1–1.5)
BILIRUB UR QL STRIP.AUTO: NEGATIVE
BUN SERPL-MCNC: 16 MG/DL (ref 8–22)
CALCIUM ALBUM COR SERPL-MCNC: 9 MG/DL (ref 8.5–10.5)
CALCIUM SERPL-MCNC: 9.3 MG/DL (ref 8.4–10.2)
CHLORIDE SERPL-SCNC: 103 MMOL/L (ref 96–112)
CO2 SERPL-SCNC: 25 MMOL/L (ref 20–33)
COLOR UR: YELLOW
CREAT SERPL-MCNC: 0.9 MG/DL (ref 0.5–1.4)
CRP SERPL HS-MCNC: 0.72 MG/DL (ref 0–0.75)
EOSINOPHIL # BLD AUTO: 0.17 K/UL (ref 0–0.51)
EOSINOPHIL NFR BLD: 2.1 % (ref 0–6.9)
ERYTHROCYTE [DISTWIDTH] IN BLOOD BY AUTOMATED COUNT: 40 FL (ref 35.9–50)
ERYTHROCYTE [SEDIMENTATION RATE] IN BLOOD BY WESTERGREN METHOD: 11 MM/HOUR (ref 0–25)
GFR SERPLBLD CREATININE-BSD FMLA CKD-EPI: 84 ML/MIN/1.73 M 2
GLOBULIN SER CALC-MCNC: 2.8 G/DL (ref 1.9–3.5)
GLUCOSE SERPL-MCNC: 106 MG/DL (ref 65–99)
GLUCOSE UR STRIP.AUTO-MCNC: NEGATIVE MG/DL
HCG SERPL QL: NEGATIVE
HCT VFR BLD AUTO: 40.1 % (ref 37–47)
HGB BLD-MCNC: 13.5 G/DL (ref 12–16)
IMM GRANULOCYTES # BLD AUTO: 0.03 K/UL (ref 0–0.11)
IMM GRANULOCYTES NFR BLD AUTO: 0.4 % (ref 0–0.9)
KETONES UR STRIP.AUTO-MCNC: ABNORMAL MG/DL
LEUKOCYTE ESTERASE UR QL STRIP.AUTO: NEGATIVE
LYMPHOCYTES # BLD AUTO: 3.42 K/UL (ref 1–4.8)
LYMPHOCYTES NFR BLD: 41.5 % (ref 22–41)
MCH RBC QN AUTO: 30.7 PG (ref 27–33)
MCHC RBC AUTO-ENTMCNC: 33.7 G/DL (ref 32.2–35.5)
MCV RBC AUTO: 91.1 FL (ref 81.4–97.8)
MICRO URNS: ABNORMAL
MONOCYTES # BLD AUTO: 0.57 K/UL (ref 0–0.85)
MONOCYTES NFR BLD AUTO: 6.9 % (ref 0–13.4)
NEUTROPHILS # BLD AUTO: 4.04 K/UL (ref 1.82–7.42)
NEUTROPHILS NFR BLD: 48.9 % (ref 44–72)
NITRITE UR QL STRIP.AUTO: NEGATIVE
NRBC # BLD AUTO: 0 K/UL
NRBC BLD-RTO: 0 /100 WBC (ref 0–0.2)
PH UR STRIP.AUTO: 5.5 [PH] (ref 5–8)
PLATELET # BLD AUTO: 336 K/UL (ref 164–446)
PMV BLD AUTO: 9 FL (ref 9–12.9)
POTASSIUM SERPL-SCNC: 3.9 MMOL/L (ref 3.6–5.5)
PROT SERPL-MCNC: 7.2 G/DL (ref 6–8.2)
PROT UR QL STRIP: NEGATIVE MG/DL
RBC # BLD AUTO: 4.4 M/UL (ref 4.2–5.4)
RBC UR QL AUTO: NEGATIVE
SODIUM SERPL-SCNC: 137 MMOL/L (ref 135–145)
SP GR UR STRIP.AUTO: >=1.03
WBC # BLD AUTO: 8.3 K/UL (ref 4.8–10.8)

## 2023-09-09 PROCEDURE — 86140 C-REACTIVE PROTEIN: CPT

## 2023-09-09 PROCEDURE — 85025 COMPLETE CBC W/AUTO DIFF WBC: CPT

## 2023-09-09 PROCEDURE — 51798 US URINE CAPACITY MEASURE: CPT

## 2023-09-09 PROCEDURE — 84703 CHORIONIC GONADOTROPIN ASSAY: CPT

## 2023-09-09 PROCEDURE — 73130 X-RAY EXAM OF HAND: CPT | Mod: LT

## 2023-09-09 PROCEDURE — 85652 RBC SED RATE AUTOMATED: CPT

## 2023-09-09 PROCEDURE — 81003 URINALYSIS AUTO W/O SCOPE: CPT

## 2023-09-09 PROCEDURE — 73130 X-RAY EXAM OF HAND: CPT | Mod: RT

## 2023-09-09 PROCEDURE — 80053 COMPREHEN METABOLIC PANEL: CPT

## 2023-09-09 PROCEDURE — 36415 COLL VENOUS BLD VENIPUNCTURE: CPT
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
## 2023-09-09 NOTE — ED NOTES
Pt discharged home with instructions to follow up with occupational health.  The pt denies questions at this time.  Pt instructed to come back to the ER if symptoms worsen or they feel they are having a medical emergency.  Pt is alert and oriented, speaking in full sentences. Pt ambulates to the waiting area without incident.

## 2023-12-12 ENCOUNTER — APPOINTMENT (OUTPATIENT)
Dept: MEDICAL GROUP | Facility: LAB | Age: 38
End: 2023-12-12
Payer: COMMERCIAL

## 2024-02-15 ENCOUNTER — APPOINTMENT (OUTPATIENT)
Dept: RADIOLOGY | Facility: MEDICAL CENTER | Age: 39
End: 2024-02-15
Attending: EMERGENCY MEDICINE
Payer: COMMERCIAL

## 2024-02-15 ENCOUNTER — HOSPITAL ENCOUNTER (EMERGENCY)
Facility: MEDICAL CENTER | Age: 39
End: 2024-02-15
Attending: EMERGENCY MEDICINE
Payer: COMMERCIAL

## 2024-02-15 VITALS
TEMPERATURE: 98.4 F | BODY MASS INDEX: 37.15 KG/M2 | WEIGHT: 209.66 LBS | HEART RATE: 87 BPM | OXYGEN SATURATION: 98 % | RESPIRATION RATE: 17 BRPM | SYSTOLIC BLOOD PRESSURE: 159 MMHG | DIASTOLIC BLOOD PRESSURE: 90 MMHG | HEIGHT: 63 IN

## 2024-02-15 DIAGNOSIS — L01.00 IMPETIGO: ICD-10-CM

## 2024-02-15 DIAGNOSIS — R11.2 NAUSEA AND VOMITING, UNSPECIFIED VOMITING TYPE: ICD-10-CM

## 2024-02-15 LAB
ALBUMIN SERPL BCP-MCNC: 4.3 G/DL (ref 3.2–4.9)
ALBUMIN/GLOB SERPL: 1.6 G/DL
ALP SERPL-CCNC: 60 U/L (ref 30–99)
ALT SERPL-CCNC: 14 U/L (ref 2–50)
ANION GAP SERPL CALC-SCNC: 12 MMOL/L (ref 7–16)
APPEARANCE UR: ABNORMAL
AST SERPL-CCNC: 13 U/L (ref 12–45)
BACTERIA #/AREA URNS HPF: ABNORMAL /HPF
BASOPHILS # BLD AUTO: 0.4 % (ref 0–1.8)
BASOPHILS # BLD: 0.03 K/UL (ref 0–0.12)
BILIRUB SERPL-MCNC: 0.2 MG/DL (ref 0.1–1.5)
BILIRUB UR QL STRIP.AUTO: NEGATIVE
BUN SERPL-MCNC: 20 MG/DL (ref 8–22)
CALCIUM ALBUM COR SERPL-MCNC: 8.9 MG/DL (ref 8.5–10.5)
CALCIUM SERPL-MCNC: 9.1 MG/DL (ref 8.4–10.2)
CHLORIDE SERPL-SCNC: 103 MMOL/L (ref 96–112)
CO2 SERPL-SCNC: 23 MMOL/L (ref 20–33)
COLOR UR: YELLOW
CREAT SERPL-MCNC: 0.85 MG/DL (ref 0.5–1.4)
EKG IMPRESSION: NORMAL
EOSINOPHIL # BLD AUTO: 0.15 K/UL (ref 0–0.51)
EOSINOPHIL NFR BLD: 2.1 % (ref 0–6.9)
EPI CELLS #/AREA URNS HPF: ABNORMAL /HPF
ERYTHROCYTE [DISTWIDTH] IN BLOOD BY AUTOMATED COUNT: 41.9 FL (ref 35.9–50)
FLUAV RNA SPEC QL NAA+PROBE: NEGATIVE
FLUBV RNA SPEC QL NAA+PROBE: NEGATIVE
GFR SERPLBLD CREATININE-BSD FMLA CKD-EPI: 90 ML/MIN/1.73 M 2
GLOBULIN SER CALC-MCNC: 2.7 G/DL (ref 1.9–3.5)
GLUCOSE SERPL-MCNC: 98 MG/DL (ref 65–99)
GLUCOSE UR STRIP.AUTO-MCNC: NEGATIVE MG/DL
HCG SERPL QL: NEGATIVE
HCT VFR BLD AUTO: 41.5 % (ref 37–47)
HGB BLD-MCNC: 14.5 G/DL (ref 12–16)
IMM GRANULOCYTES # BLD AUTO: 0.03 K/UL (ref 0–0.11)
IMM GRANULOCYTES NFR BLD AUTO: 0.4 % (ref 0–0.9)
KETONES UR STRIP.AUTO-MCNC: NEGATIVE MG/DL
LEUKOCYTE ESTERASE UR QL STRIP.AUTO: NEGATIVE
LIPASE SERPL-CCNC: 44 U/L (ref 11–82)
LYMPHOCYTES # BLD AUTO: 2.55 K/UL (ref 1–4.8)
LYMPHOCYTES NFR BLD: 35.7 % (ref 22–41)
MCH RBC QN AUTO: 32.5 PG (ref 27–33)
MCHC RBC AUTO-ENTMCNC: 34.9 G/DL (ref 32.2–35.5)
MCV RBC AUTO: 93 FL (ref 81.4–97.8)
MICRO URNS: ABNORMAL
MONOCYTES # BLD AUTO: 0.43 K/UL (ref 0–0.85)
MONOCYTES NFR BLD AUTO: 6 % (ref 0–13.4)
MUCOUS THREADS #/AREA URNS HPF: ABNORMAL /HPF
NEUTROPHILS # BLD AUTO: 3.95 K/UL (ref 1.82–7.42)
NEUTROPHILS NFR BLD: 55.4 % (ref 44–72)
NITRITE UR QL STRIP.AUTO: NEGATIVE
NRBC # BLD AUTO: 0 K/UL
NRBC BLD-RTO: 0 /100 WBC (ref 0–0.2)
PH UR STRIP.AUTO: 6 [PH] (ref 5–8)
PLATELET # BLD AUTO: 304 K/UL (ref 164–446)
PMV BLD AUTO: 9.1 FL (ref 9–12.9)
POTASSIUM SERPL-SCNC: 4.1 MMOL/L (ref 3.6–5.5)
PROCALCITONIN SERPL-MCNC: 0.03 NG/ML
PROT SERPL-MCNC: 7 G/DL (ref 6–8.2)
PROT UR QL STRIP: NEGATIVE MG/DL
RBC # BLD AUTO: 4.46 M/UL (ref 4.2–5.4)
RBC # URNS HPF: ABNORMAL /HPF
RBC UR QL AUTO: NEGATIVE
RSV RNA SPEC QL NAA+PROBE: NEGATIVE
SARS-COV-2 RNA RESP QL NAA+PROBE: NOTDETECTED
SCCMEC + MECA PNL NOSE NAA+PROBE: NEGATIVE
SODIUM SERPL-SCNC: 138 MMOL/L (ref 135–145)
SP GR UR STRIP.AUTO: >=1.03
SPECIMEN SOURCE: NORMAL
TROPONIN T SERPL-MCNC: <6 NG/L (ref 6–19)
TSH SERPL DL<=0.005 MIU/L-ACNC: 1.04 UIU/ML (ref 0.38–5.33)
UNIDENT CRYS URNS QL MICRO: ABNORMAL /HPF
WBC # BLD AUTO: 7.1 K/UL (ref 4.8–10.8)
WBC #/AREA URNS HPF: ABNORMAL /HPF

## 2024-02-15 PROCEDURE — 96375 TX/PRO/DX INJ NEW DRUG ADDON: CPT

## 2024-02-15 PROCEDURE — 84145 PROCALCITONIN (PCT): CPT

## 2024-02-15 PROCEDURE — 80053 COMPREHEN METABOLIC PANEL: CPT

## 2024-02-15 PROCEDURE — 84443 ASSAY THYROID STIM HORMONE: CPT

## 2024-02-15 PROCEDURE — 96374 THER/PROPH/DIAG INJ IV PUSH: CPT

## 2024-02-15 PROCEDURE — 93005 ELECTROCARDIOGRAM TRACING: CPT | Performed by: EMERGENCY MEDICINE

## 2024-02-15 PROCEDURE — 700105 HCHG RX REV CODE 258: Performed by: EMERGENCY MEDICINE

## 2024-02-15 PROCEDURE — 71045 X-RAY EXAM CHEST 1 VIEW: CPT

## 2024-02-15 PROCEDURE — 84703 CHORIONIC GONADOTROPIN ASSAY: CPT

## 2024-02-15 PROCEDURE — 85025 COMPLETE CBC W/AUTO DIFF WBC: CPT

## 2024-02-15 PROCEDURE — 83690 ASSAY OF LIPASE: CPT

## 2024-02-15 PROCEDURE — 700102 HCHG RX REV CODE 250 W/ 637 OVERRIDE(OP): Performed by: EMERGENCY MEDICINE

## 2024-02-15 PROCEDURE — 700101 HCHG RX REV CODE 250: Performed by: EMERGENCY MEDICINE

## 2024-02-15 PROCEDURE — 84484 ASSAY OF TROPONIN QUANT: CPT

## 2024-02-15 PROCEDURE — 87086 URINE CULTURE/COLONY COUNT: CPT

## 2024-02-15 PROCEDURE — 0241U HCHG SARS-COV-2 COVID-19 NFCT DS RESP RNA 4 TRGT MIC: CPT

## 2024-02-15 PROCEDURE — 93005 ELECTROCARDIOGRAM TRACING: CPT

## 2024-02-15 PROCEDURE — A9270 NON-COVERED ITEM OR SERVICE: HCPCS | Performed by: EMERGENCY MEDICINE

## 2024-02-15 PROCEDURE — 700111 HCHG RX REV CODE 636 W/ 250 OVERRIDE (IP): Performed by: EMERGENCY MEDICINE

## 2024-02-15 PROCEDURE — 87641 MR-STAPH DNA AMP PROBE: CPT

## 2024-02-15 PROCEDURE — 36415 COLL VENOUS BLD VENIPUNCTURE: CPT

## 2024-02-15 PROCEDURE — 99285 EMERGENCY DEPT VISIT HI MDM: CPT

## 2024-02-15 PROCEDURE — 700117 HCHG RX CONTRAST REV CODE 255: Performed by: EMERGENCY MEDICINE

## 2024-02-15 PROCEDURE — 81001 URINALYSIS AUTO W/SCOPE: CPT

## 2024-02-15 PROCEDURE — 70487 CT MAXILLOFACIAL W/DYE: CPT

## 2024-02-15 RX ORDER — SODIUM CHLORIDE, SODIUM LACTATE, POTASSIUM CHLORIDE, AND CALCIUM CHLORIDE .6; .31; .03; .02 G/100ML; G/100ML; G/100ML; G/100ML
30 INJECTION, SOLUTION INTRAVENOUS ONCE
Status: COMPLETED | OUTPATIENT
Start: 2024-02-15 | End: 2024-02-15

## 2024-02-15 RX ORDER — PROCHLORPERAZINE EDISYLATE 5 MG/ML
10 INJECTION INTRAMUSCULAR; INTRAVENOUS ONCE
Status: COMPLETED | OUTPATIENT
Start: 2024-02-15 | End: 2024-02-15

## 2024-02-15 RX ORDER — CEPHALEXIN 250 MG/1
500 CAPSULE ORAL ONCE
Status: COMPLETED | OUTPATIENT
Start: 2024-02-15 | End: 2024-02-15

## 2024-02-15 RX ORDER — ONDANSETRON 4 MG/1
4 TABLET, ORALLY DISINTEGRATING ORAL EVERY 6 HOURS PRN
Qty: 10 TABLET | Refills: 0 | Status: SHIPPED | OUTPATIENT
Start: 2024-02-15

## 2024-02-15 RX ORDER — DIPHENHYDRAMINE HYDROCHLORIDE 50 MG/ML
25 INJECTION INTRAMUSCULAR; INTRAVENOUS ONCE
Status: COMPLETED | OUTPATIENT
Start: 2024-02-15 | End: 2024-02-15

## 2024-02-15 RX ORDER — KETOROLAC TROMETHAMINE 15 MG/ML
15 INJECTION, SOLUTION INTRAMUSCULAR; INTRAVENOUS ONCE
Status: COMPLETED | OUTPATIENT
Start: 2024-02-15 | End: 2024-02-15

## 2024-02-15 RX ORDER — CEPHALEXIN 500 MG/1
500 CAPSULE ORAL 4 TIMES DAILY
Qty: 20 CAPSULE | Refills: 0 | Status: ACTIVE | OUTPATIENT
Start: 2024-02-15 | End: 2024-02-20

## 2024-02-15 RX ORDER — LIDOCAINE HYDROCHLORIDE 20 MG/ML
15 SOLUTION OROPHARYNGEAL ONCE
Status: COMPLETED | OUTPATIENT
Start: 2024-02-15 | End: 2024-02-15

## 2024-02-15 RX ADMIN — PROCHLORPERAZINE EDISYLATE 10 MG: 5 INJECTION INTRAMUSCULAR; INTRAVENOUS at 10:17

## 2024-02-15 RX ADMIN — LIDOCAINE HYDROCHLORIDE 15 ML: 20 SOLUTION ORAL at 10:17

## 2024-02-15 RX ADMIN — SODIUM CHLORIDE, POTASSIUM CHLORIDE, SODIUM LACTATE AND CALCIUM CHLORIDE 1572 ML: 600; 310; 30; 20 INJECTION, SOLUTION INTRAVENOUS at 10:18

## 2024-02-15 RX ADMIN — IOHEXOL 80 ML: 350 INJECTION, SOLUTION INTRAVENOUS at 11:09

## 2024-02-15 RX ADMIN — DIPHENHYDRAMINE HYDROCHLORIDE 25 MG: 50 INJECTION, SOLUTION INTRAMUSCULAR; INTRAVENOUS at 10:17

## 2024-02-15 RX ADMIN — CEPHALEXIN 500 MG: 250 CAPSULE ORAL at 13:26

## 2024-02-15 RX ADMIN — KETOROLAC TROMETHAMINE 15 MG: 15 INJECTION, SOLUTION INTRAMUSCULAR; INTRAVENOUS at 11:10

## 2024-02-15 ASSESSMENT — PAIN DESCRIPTION - PAIN TYPE: TYPE: ACUTE PAIN

## 2024-02-15 ASSESSMENT — FIBROSIS 4 INDEX: FIB4 SCORE: 0.51

## 2024-02-15 NOTE — ED NOTES
Pt states she has sores in her nostrils that started on Tuesday. Pt states she was throwing up prior to the sores showing up. Pt states she has been feeling palpitations in her heart since Monday. Pt states she gets intermittent pains in her chest since Monday. Pt states history of SVT. Pt states she has been having head pain since Tuesday. Pt states she has noticed intermittent swelling in her hands feet and face.

## 2024-02-15 NOTE — ED NOTES
ERP aware that only 400cc LR bolus given, pt states she does not wish to have the remainder infused.

## 2024-02-15 NOTE — ED NOTES
Pt AO4, resting on gurney. States headache is 2/10 after receiving previously administered medications. Toradol IV administered per emar.   Pt placed on cardiac monitor: SR 60s/70s.   Safety precautions in place including gurney locked in lowest position, both side rails up, call light within reach. Pt educated to use call light if requiring any assistance with getting oob.

## 2024-02-15 NOTE — DISCHARGE INSTRUCTIONS
You were seen in the ER for multiple concerns including painful sores on your nose that I think might be an infection called impetigo.  I have given you a prescription for antibiotics, use it as directed.  Thankfully your labs and imaging today were reassuring and that you do not need emergency consultation however you do need to follow-up with your ENT now that your facial CT has been done because this did show significant abnormalities that need to be followed up as an outpatient.  I have provided you with a prescription for nausea medication.  It will be important that you follow-up with a primary care physician and I have provided you with a list of clinics where you can do so.  Return to the ER with new or worsening symptoms.  I hope you feel better soon!

## 2024-02-15 NOTE — ED PROVIDER NOTES
ED Provider Note    CHIEF COMPLAINT  No chief complaint on file.      EXTERNAL RECORDS REVIEWED  Outpatient Notes seen in urgent care 11/1/2023 for ear fullness and pain.  She reported having had chronic sinusitis for over a year but was supposed to see ENT in December.  At that time she stated that her nose was constantly running, it smelled bad, and her face swelled which she considered to be part of her chronic sinusitis.  She did not want to use Flonase or antihistamines.  She did not want to use drops in her ears.  She requested a trial of prednisone so was prescribed 20 mg daily for 5 days.  She was instructed to follow-up with ENT.    HPI/ROS  LIMITATION TO HISTORY   Select: : None  OUTSIDE HISTORIAN(S):  None    Eulalia Rendon is a 38 y.o. female who presents with multiple concerns including sores in and around her nostrils, nausea and vomiting, heart palpitations, intermittent chest pain, and facial swelling.  Patient notes that she has been dealing with a chronic sinus infection.  She followed up with ENT in December and the plan was for an outpatient CT scan however she has been unable to afford the imaging and is currently saving her money for such.  She notes within the past week she has been waking up with a swollen face which is significantly worse than prior.  The swelling seems to improve over the course of the day.  2 days ago she developed tactile fevers and developed multiple episodes of nausea and vomiting with associated diarrhea.  She denies any hematemesis, melena, hematochezia.  She has a history of SVT but has undergone an ablation.  Since her ablation and within the past 1 to 2 months she has had episodes that remind her of her previous episodes of SVT.  She has not visited the hospital or had an EKG proving that she has an such rhythm.  She has not followed up with cardiology.  She has had some intermittent chest pain but no shortness of breath.  She denies any cough or cold  symptoms.  She has some abdominal pain in the upper portion of her abdomen that started with the nausea and vomiting.  She has trialed Tylenol and ibuprofen without improvement of her symptoms.  She denies chance of pregnancy.  She uses oral CBD and vapes but otherwise denies alcohol or illicit drug use.  Patient does have a history of herpes genitalia.    PAST MEDICAL HISTORY   has a past medical history of Anxiety, Arthritis, C. difficile colitis, Colitis, Depression, Ear infection, Esophagitis, Headache(784.0), Infectious disease, Psychiatric disorder, Sinus infection, SVT (supraventricular tachycardia) (HCC), SVT (supraventricular tachycardia) (HCC) (12/21/2012), and Urinary tract infection, site not specified.    SURGICAL HISTORY   has a past surgical history that includes gyn surgery; primary c section (2004); repeat c section w tubal ligation (7/15/2013); other cardiac surgery; exploratory laparotomy (12/18/2016); and dilation and evacuation (12/18/2016).    FAMILY HISTORY  Family History   Problem Relation Age of Onset    Thyroid Mother     Other Mother         svt    Hyperlipidemia Mother     Hyperlipidemia Father     Hypertension Father     Diabetes Father         pills    Alcohol/Drug Father     Thyroid Sister     Other Brother         heart murmur    Stroke Paternal Grandfather     Heart Attack Paternal Grandfather     Cancer Paternal Grandfather         prostate       SOCIAL HISTORY  Social History     Tobacco Use    Smoking status: Former     Current packs/day: 0.50     Types: Cigarettes    Smokeless tobacco: Never   Vaping Use    Vaping Use: Every day    Substances: Nicotine   Substance and Sexual Activity    Alcohol use: Not Currently     Comment: denies    Drug use: Not Currently     Comment: edibles    Sexual activity: Yes     Partners: Male     Comment: none       CURRENT MEDICATIONS  Home Medications    **Home medications have not yet been reviewed for this encounter**    "      ALLERGIES  Allergies   Allergen Reactions    Clindamycin Hives       PHYSICAL EXAM  VITAL SIGNS: BP (!) 152/105   Pulse 98   Temp 36.9 °C (98.4 °F) (Temporal)   Resp 14   Ht 1.6 m (5' 3\")   Wt 95.1 kg (209 lb 10.5 oz)   SpO2 99%   BMI 37.14 kg/m²    Physical Exam  Vitals and nursing note reviewed.   Constitutional:       Appearance: She is well-developed.   HENT:      Head: Normocephalic and atraumatic.      Comments: Several small ulcerative lesions both on the nose and in the bilateral nares with overlying honey crusting.  Eyes:      Extraocular Movements: Extraocular movements intact.      Pupils: Pupils are equal, round, and reactive to light.   Cardiovascular:      Rate and Rhythm: Normal rate and regular rhythm.      Heart sounds: Normal heart sounds.   Pulmonary:      Effort: Pulmonary effort is normal. No tachypnea.      Breath sounds: Normal breath sounds. No wheezing, rhonchi or rales.   Abdominal:      Palpations: Abdomen is soft.      Comments: Tender to palpation in the bilateral upper quadrants and epigastric region.  No peritoneal signs.   Musculoskeletal:      Cervical back: Normal range of motion and neck supple.      Right lower leg: No edema.      Left lower leg: No edema.   Skin:     General: Skin is warm and dry.   Neurological:      General: No focal deficit present.      Mental Status: She is alert and oriented to person, place, and time.   Psychiatric:         Mood and Affect: Mood normal.         Behavior: Behavior normal.       DIAGNOSTIC STUDIES / PROCEDURES  LABS  Results for orders placed or performed during the hospital encounter of 02/15/24   CBC WITH DIFFERENTIAL   Result Value Ref Range    WBC 7.1 4.8 - 10.8 K/uL    RBC 4.46 4.20 - 5.40 M/uL    Hemoglobin 14.5 12.0 - 16.0 g/dL    Hematocrit 41.5 37.0 - 47.0 %    MCV 93.0 81.4 - 97.8 fL    MCH 32.5 27.0 - 33.0 pg    MCHC 34.9 32.2 - 35.5 g/dL    RDW 41.9 35.9 - 50.0 fL    Platelet Count 304 164 - 446 K/uL    MPV 9.1 9.0 - " 12.9 fL    Neutrophils-Polys 55.40 44.00 - 72.00 %    Lymphocytes 35.70 22.00 - 41.00 %    Monocytes 6.00 0.00 - 13.40 %    Eosinophils 2.10 0.00 - 6.90 %    Basophils 0.40 0.00 - 1.80 %    Immature Granulocytes 0.40 0.00 - 0.90 %    Nucleated RBC 0.00 0.00 - 0.20 /100 WBC    Neutrophils (Absolute) 3.95 1.82 - 7.42 K/uL    Lymphs (Absolute) 2.55 1.00 - 4.80 K/uL    Monos (Absolute) 0.43 0.00 - 0.85 K/uL    Eos (Absolute) 0.15 0.00 - 0.51 K/uL    Baso (Absolute) 0.03 0.00 - 0.12 K/uL    Immature Granulocytes (abs) 0.03 0.00 - 0.11 K/uL    NRBC (Absolute) 0.00 K/uL   Comp Metabolic Panel   Result Value Ref Range    Sodium 138 135 - 145 mmol/L    Potassium 4.1 3.6 - 5.5 mmol/L    Chloride 103 96 - 112 mmol/L    Co2 23 20 - 33 mmol/L    Anion Gap 12.0 7.0 - 16.0    Glucose 98 65 - 99 mg/dL    Bun 20 8 - 22 mg/dL    Creatinine 0.85 0.50 - 1.40 mg/dL    Calcium 9.1 8.4 - 10.2 mg/dL    Correct Calcium 8.9 8.5 - 10.5 mg/dL    AST(SGOT) 13 12 - 45 U/L    ALT(SGPT) 14 2 - 50 U/L    Alkaline Phosphatase 60 30 - 99 U/L    Total Bilirubin 0.2 0.1 - 1.5 mg/dL    Albumin 4.3 3.2 - 4.9 g/dL    Total Protein 7.0 6.0 - 8.2 g/dL    Globulin 2.7 1.9 - 3.5 g/dL    A-G Ratio 1.6 g/dL   TROPONIN   Result Value Ref Range    Troponin T <6 6 - 19 ng/L   BETA-HCG QUALITATIVE SERUM   Result Value Ref Range    Beta-Hcg Qualitative Serum Negative Negative   LIPASE   Result Value Ref Range    Lipase 44 11 - 82 U/L   Urinalysis    Specimen: Urine, Clean Catch   Result Value Ref Range    Color Yellow     Character Hazy (A)     Specific Gravity >=1.030 <1.035    Ph 6.0 5.0 - 8.0    Glucose Negative Negative mg/dL    Ketones Negative Negative mg/dL    Protein Negative Negative mg/dL    Bilirubin Negative Negative    Nitrite Negative Negative    Leukocyte Esterase Negative Negative    Occult Blood Negative Negative    Micro Urine Req Microscopic    Procalcitonin   Result Value Ref Range    Procalcitonin 0.03 <0.25 ng/mL   TSH WITH REFLEX TO FT4    Result Value Ref Range    TSH 1.040 0.380 - 5.330 uIU/mL   MRSA By PCR (Amp)    Specimen: Nares; Respirate   Result Value Ref Range    MRSA by PCR Negative Negative   CoV-2, FLU A/B, and RSV by PCR (2-4 Hours CEPHEID) : Collect NP swab in VTM    Specimen: Respirate   Result Value Ref Range    Influenza virus A RNA Negative Negative    Influenza virus B, PCR Negative Negative    RSV, PCR Negative Negative    SARS-CoV-2 by PCR NotDetected     SARS-CoV-2 Source NP Swab    ESTIMATED GFR   Result Value Ref Range    GFR (CKD-EPI) 90 >60 mL/min/1.73 m 2   URINE MICROSCOPIC (W/UA)   Result Value Ref Range    WBC Rare /hpf    RBC 0-2 /hpf    Bacteria Rare (A) None /hpf    Epithelial Cells Few Few /hpf    Mucous Threads Moderate /hpf    Urine Crystals Rare Amorphous /hpf   EKG   Result Value Ref Range    Report       [facility]    Test Date:  2024-02-15  Pt Name:    ANNA CIFUENTES               Department: [facility]  MRN:        [id number]                      [facility]  Gender:     Female                       Technician: KENDAL  :        [id number]                   Requested By:ER TRIAGE PROTOCOL  Order #:    [id number]                    Reading MD: Simone Pedersen MD    Measurements  Intervals                                Axis  Rate:       84                           P:          47  MN:         144                          QRS:        47  QRSD:       85                           T:          28  QT:         347  QTc:        411    Interpretive Statements  Sinus rhythm  Compared to ECG 2023 14:58:55  No significant changes  Electronically Signed On 02- 10:49:10 PST by Simone Pedersen MD       RADIOLOGY  I have independently interpreted the diagnostic imaging associated with this visit and am waiting the final reading from the radiologist.   My preliminary interpretation is as follows: No lobar pneumonia  Radiologist interpretation:   CT-MAXILLOFACIAL WITH PLUS RECONS    Final Result         The left maxillary sinus shows complete opacification with high-density content. Additionally, there is remodeling and bulging of the medial wall of the left maxillary sinus, with the bulge abutting the middle turbinate. This could relate to sinusitis.      DX-CHEST-PORTABLE (1 VIEW)   Final Result         1. No acute cardiopulmonary abnormalities are identified.        COURSE & MEDICAL DECISION MAKING    ED Observation Status? No; Patient does not meet criteria for ED Observation.     INITIAL ASSESSMENT, COURSE AND PLAN  Care Narrative: This is a 38-year-old female who is here with multiple complaints including sores in and around her nostrils, nausea and vomiting, heart palpitations, chest pain, and facial swelling.  It is difficult to come up with a complete and encompassing differential diagnosis but it includes, and is clearly not limited to, viral syndrome, cholecystitis, choledocholithiasis symptomatic cholelithiasis, ACS, arrhythmia, PE, pneumonia, facial cellulitis, sinus infection, shingles, impetigo.    Arrives hypertensive but AF with otherwise normal VS. Appears dehydrated with dry mucous membranes but non-toxic. She has several lesions on her nose and within her nares bilaterally that have overlying honey crusting. The bilateral nature of the lesions makes shingles unlikely. With the honey crusting they appear to be impetigo so we will trial keflex.     She was started on IV fluids and given pain and nausea medication for her symptoms.     EKG reveals normal sinus rhythm without signs of ischemia. A troponin was obtained and is negative. Very low suspicion at this point for ACS and from a cardiac standpoint I did not feel that additional workup was indicated. I did stress the importance of outpatient follow up with cardiology given her reports of continued palpitations despite ablation. She was maintained on telemetry in the ED without any arrhythmia.    CXR is without acute  abnormality.    UA did not suggest infection. CBC is without leukocytosis or anemia. Differential is completely normal across the board. Metabolic panel is consistently normal across the board. Lipase is normal. TSH and HCG are negative. Procalcitonin is negative. Viral swabs are normal. MRSA pcr negative.    Given patient's reports of facial swelling a CTA of the maxillofacial region was obtained. This was grossly abnormal with left maxillary sinus showing complete opacification with some other findings consistent with likely sinusitis. Patient is already established with an ENT and will be best served by following up as an outpatient. There is no indication for emergent consultation.    Patient was reevaluated at bedside. She is resting comfortably. Her symptoms are resolved. She is tolerating PO. VS remain normal and stable with the exception of mild hypertension. She is safe for d/c with close outpatient follow up. I recommended she f/u with her cardiologist, ENT, and establish with a PCP. Discharged in good and stable condition with strict return precautions.    HYDRATION: Based on the patient's presentation of Dehydration the patient was given IV fluids. IV Hydration was used because oral hydration was not adequate alone. Upon recheck following hydration, the patient was improved.    ADDITIONAL PROBLEM LIST  None  DISPOSITION AND DISCUSSIONS  I have discussed management of the patient with the following physicians and BRAXTON's:  None    Discussion of management with other QHP or appropriate source(s): None     Escalation of care considered, and ultimately not performed: N/A.    Barriers to care at this time, including but not limited to: Patient does not have established PCP.     Decision tools and prescription drugs considered including, but not limited to: HEART Score 1 .    FINAL DIAGNOSIS  1. Impetigo    2. Nausea and vomiting, unspecified vomiting type      Electronically signed by: Simone Pedersen M.D.,  2/15/2024 9:12 AM

## 2024-02-15 NOTE — ED TRIAGE NOTES
"Chief Complaint   Patient presents with    Nasal Pain     She noticed that day she developed nasal pain and that she has \"sores\" in his nares. She has been following an ENT - she is suppose to be getting a CT scan done.   Currently has a headache.     N/V     Tuesday and Wednesday patient was vomiting. Generalized abd pain. Subjective fevers on Tuesday. Black and brown diarrhea.  No urinary symptoms.      Chest Pain     History of SVT. A month ago she states she had covid and had 2 episodes of what felt like SVT. She has not called cardiology.     BP (!) 152/105   Pulse 98   Temp 36.9 °C (98.4 °F) (Temporal)   Resp 14   Ht 1.6 m (5' 3\")   Wt 95.1 kg (209 lb 10.5 oz)   LMP 01/16/2024 (Approximate)   SpO2 99%   BMI 37.14 kg/m²     "

## 2024-02-17 LAB
BACTERIA UR CULT: NORMAL
SIGNIFICANT IND 70042: NORMAL
SITE SITE: NORMAL
SOURCE SOURCE: NORMAL

## 2024-03-22 ENCOUNTER — APPOINTMENT (OUTPATIENT)
Dept: ADMISSIONS | Facility: MEDICAL CENTER | Age: 39
End: 2024-03-22
Attending: OTOLARYNGOLOGY
Payer: COMMERCIAL

## 2024-03-22 ENCOUNTER — HOSPITAL ENCOUNTER (OUTPATIENT)
Facility: MEDICAL CENTER | Age: 39
End: 2024-03-22
Attending: OTOLARYNGOLOGY | Admitting: OTOLARYNGOLOGY
Payer: COMMERCIAL

## 2024-04-02 ENCOUNTER — PRE-ADMISSION TESTING (OUTPATIENT)
Dept: ADMISSIONS | Facility: MEDICAL CENTER | Age: 39
End: 2024-04-02
Attending: OTOLARYNGOLOGY
Payer: COMMERCIAL

## 2024-04-02 RX ORDER — PRENATAL VIT 91/IRON/FOLIC/DHA 28-975-200
COMBINATION PACKAGE (EA) ORAL 2 TIMES DAILY
COMMUNITY
Start: 2024-03-18 | End: 2024-04-17

## 2024-04-02 RX ORDER — LISINOPRIL 10 MG/1
10 TABLET ORAL DAILY
COMMUNITY
Start: 2024-03-20

## 2024-04-02 RX ORDER — IBUPROFEN 200 MG
600 TABLET ORAL EVERY 6 HOURS PRN
COMMUNITY

## 2024-04-02 RX ORDER — ACETAMINOPHEN 500 MG
500-1000 TABLET ORAL EVERY 6 HOURS PRN
COMMUNITY
Start: 2024-04-01

## 2024-04-03 NOTE — OR NURSING
RN telephone preadmission appointment completed with Eulalia Rendon. Eulalia states she was prescribed Lisinopril by her primary MD but states she decided on her own to not start yet. She states her blood pressure runs about 150's/ 90's. I advised Eulalia to start Lisinopril as prescribed by her primary MD but she needs to hold Lisinopril for 24 hours prior to surgery. Eulalia states she is unable to come in for HCG test prior to surgery.